# Patient Record
Sex: MALE | Race: WHITE | Employment: FULL TIME | URBAN - METROPOLITAN AREA
[De-identification: names, ages, dates, MRNs, and addresses within clinical notes are randomized per-mention and may not be internally consistent; named-entity substitution may affect disease eponyms.]

---

## 2017-06-27 ENCOUNTER — THERAPY VISIT (OUTPATIENT)
Dept: PHYSICAL THERAPY | Facility: CLINIC | Age: 52
End: 2017-06-27
Payer: COMMERCIAL

## 2017-06-27 DIAGNOSIS — M54.50 CHRONIC RIGHT-SIDED LOW BACK PAIN WITHOUT SCIATICA: ICD-10-CM

## 2017-06-27 DIAGNOSIS — M25.551 PAIN OF RIGHT HIP JOINT: ICD-10-CM

## 2017-06-27 DIAGNOSIS — G89.29 CHRONIC RIGHT-SIDED LOW BACK PAIN WITHOUT SCIATICA: ICD-10-CM

## 2017-06-27 PROCEDURE — 97110 THERAPEUTIC EXERCISES: CPT | Mod: GP | Performed by: PHYSICAL THERAPIST

## 2017-06-27 PROCEDURE — 97161 PT EVAL LOW COMPLEX 20 MIN: CPT | Mod: GP | Performed by: PHYSICAL THERAPIST

## 2017-06-27 NOTE — PROGRESS NOTES
Dale Durán is a 51 year old male patient presenting to Physical Therapy with the following Primary Symptoms: Right sided lower rib cage , ocassional buttock pain, occasional lateral right lateral hip pain. lumbar/flank pain,  Left knee strain.  He denies having related symptoms spreading to the lower leg, ankle, feet or toes.   He has left knee pain along the anteriomedial aspect. These pains are described as intermittent in the knee and nearly constant in the R flank.   He denies having painful cough/sneeze, saddle anaesthesia, severe night pain, peripheral motor deficit, recent bowel/bladder change, recent vision change, ringing in the ears or pain with swallowing. Pain is rated 4/10 at worst. History of symptoms: Winter 2016 noted strain with running, worked with some benefit from PT, Saw Dr. Esquivel  Suspicion of back vs hip, had lux epidural may 2016 with no effect, developed a R flank strain that has not gone away since, Hx of neck pain.  Saw Rachid Gagnon with good result on hip pain,  Then did [facility] therapy in [place], Did some PT at [facility] no lasting gains.  Then developed shingles in R gluteal which resolved.  Returned to [facility] more PT, then to MRI at [facility] spine. Sx are constant in the R low back region.  Current Symptoms are worsened by: confined sitting, prolonged standing.  Current Symptoms are relieved by painf ree while exercising.   Recent surgical procedure: none    General health as reported by patient is:  excellent.  Pertinent medical history: none significant.  He denies any significant current illness or recent hospital admissions. He denies any regonal implanted devices.  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 Previous functional status:  fully functional prior to pain onset/injury.       chapo  [facility]                    Objective:          Flexibility/Screens:   Positive screens:  Hip (positive bilat hip drop sign, overactive hip flexor component in  abd task.   limited R>L hip extension PROM in SL. )                   Lumbar/SI Evaluation  ROM:    AROM Lumbar:   Flexion:            Fingers to floor no pain  Ext:                    50%  end range pain central LB   Side Bend:        Left:  75%    Right:  75%  Rotation:           Left:     Right:   Side Glide:        Left:  50%    Right:  50%          Lumbar Myotomes:  not assessed            Lumbar DTR's:  not assessed      Cord Signs:  not assessed    Lumbar Dermtomes:  normal                  Lumbar Palpation:        Tenderness not present at Right:  Quadratus Lumborum; Erector Spinae; PSIS or Vertebral      Spinal Segmental Conclusions: Hypo Lx in extension not provocative                                            Hip Evaluation    Hip Strength:        Abduction:  Left: 3/5     Pain:Right: 3/5    Pain:  Adduction:  Left: 5/5    Pain:Right: 5/5   Pain:  Internal Rotation:  Left: 4/5    Pain:Right: 4/5   Pain:  External Rotation:  Left: 4/5   Pain:  Right: 4/5   Pain:            Hip Special Testing:    Left hip positive for the following special tests:  Enio  Left hip negative for the following special tests:  Willi; Fadir/Labrum; SLR or Femoral Nerve   Right hip positive for the following special tests:  Willi (very tight not painful) and ThomasRight hip negative for the following special tests:  Fadir/Labrum; SLR or Femoral Nerve                 General     ROS    Assessment/Plan:      Patient is a 51 year old male with lumbar and right side hip complaints.    Patient has the following significant findings with corresponding treatment plan.                Diagnosis 1:  R hip dysfunction, poss djd.  Lx DJD, extension dysfunction  Pain -  hot/cold therapy, manual therapy, splint/taping/bracing/orthotics, self management, education, directional preference exercise and home program  Decreased ROM/flexibility - manual therapy and therapeutic exercise  Decreased joint mobility - manual therapy and therapeutic  exercise  Decreased strength - therapeutic exercise and therapeutic activities  Decreased proprioception - neuro re-education and therapeutic activities  Impaired gait - gait training  Impaired muscle performance - neuro re-education  Decreased function - therapeutic activities  Impaired posture - neuro re-education    Therapy Evaluation Codes:   1) History comprised of:   Personal factors that impact the plan of care:      Time since onset of symptoms.    Comorbidity factors that impact the plan of care are:      None.     Medications impacting care: None.  2) Examination of Body Systems comprised of:   Body structures and functions that impact the plan of care:      Hip and Lumbar spine.   Activity limitations that impact the plan of care are:      Driving, Dressing, Sitting, Standing and Working.  3) Clinical presentation characteristics are:   Stable/Uncomplicated.  4) Decision-Making    Low complexity using standardized patient assessment instrument and/or measureable assessment of functional outcome.  Cumulative Therapy Evaluation is: Low complexity.    Previous and current functional limitations:  (See Goal Flow Sheet for this information)    Short term and Long term goals: (See Goal Flow Sheet for this information)     Communication ability:  Patient appears to be able to clearly communicate and understand verbal and written communication and follow directions correctly.  Treatment Explanation - The following has been discussed with the patient:   RX ordered/plan of care  Anticipated outcomes  Possible risks and side effects  This patient would benefit from PT intervention to resume normal activities.   Rehab potential is good.    Frequency:  1 X week, once daily  Duration:  for 8 weeks  Discharge Plan:  Achieve all LTG.  Independent in home treatment program.  Reach maximal therapeutic benefit.    Please refer to the daily flowsheet for treatment today, total treatment time and time spent performing 1:1  timed codes.

## 2017-06-28 PROBLEM — M54.50 CHRONIC RIGHT-SIDED LOW BACK PAIN WITHOUT SCIATICA: Status: ACTIVE | Noted: 2017-06-28

## 2017-06-28 PROBLEM — G89.29 CHRONIC RIGHT-SIDED LOW BACK PAIN WITHOUT SCIATICA: Status: ACTIVE | Noted: 2017-06-28

## 2017-06-28 PROBLEM — M25.551 PAIN OF RIGHT HIP JOINT: Status: ACTIVE | Noted: 2017-06-28

## 2017-07-05 ENCOUNTER — THERAPY VISIT (OUTPATIENT)
Dept: PHYSICAL THERAPY | Facility: CLINIC | Age: 52
End: 2017-07-05
Payer: COMMERCIAL

## 2017-07-05 DIAGNOSIS — M54.50 CHRONIC RIGHT-SIDED LOW BACK PAIN WITHOUT SCIATICA: ICD-10-CM

## 2017-07-05 DIAGNOSIS — M25.551 PAIN OF RIGHT HIP JOINT: ICD-10-CM

## 2017-07-05 DIAGNOSIS — G89.29 CHRONIC RIGHT-SIDED LOW BACK PAIN WITHOUT SCIATICA: ICD-10-CM

## 2017-07-05 PROCEDURE — 97530 THERAPEUTIC ACTIVITIES: CPT | Mod: GP | Performed by: PHYSICAL THERAPIST

## 2017-07-05 PROCEDURE — 97140 MANUAL THERAPY 1/> REGIONS: CPT | Mod: GP | Performed by: PHYSICAL THERAPIST

## 2017-07-05 PROCEDURE — 97110 THERAPEUTIC EXERCISES: CPT | Mod: GP | Performed by: PHYSICAL THERAPIST

## 2017-07-10 ENCOUNTER — THERAPY VISIT (OUTPATIENT)
Dept: PHYSICAL THERAPY | Facility: CLINIC | Age: 52
End: 2017-07-10
Payer: COMMERCIAL

## 2017-07-10 DIAGNOSIS — G89.29 CHRONIC RIGHT-SIDED LOW BACK PAIN WITHOUT SCIATICA: ICD-10-CM

## 2017-07-10 DIAGNOSIS — M25.551 PAIN OF RIGHT HIP JOINT: ICD-10-CM

## 2017-07-10 DIAGNOSIS — M54.50 CHRONIC RIGHT-SIDED LOW BACK PAIN WITHOUT SCIATICA: ICD-10-CM

## 2017-07-10 PROCEDURE — 97110 THERAPEUTIC EXERCISES: CPT | Mod: GP | Performed by: PHYSICAL THERAPIST

## 2017-07-10 PROCEDURE — 97112 NEUROMUSCULAR REEDUCATION: CPT | Mod: GP | Performed by: PHYSICAL THERAPIST

## 2017-07-17 ENCOUNTER — THERAPY VISIT (OUTPATIENT)
Dept: PHYSICAL THERAPY | Facility: CLINIC | Age: 52
End: 2017-07-17
Payer: COMMERCIAL

## 2017-07-17 DIAGNOSIS — M25.551 PAIN OF RIGHT HIP JOINT: ICD-10-CM

## 2017-07-17 DIAGNOSIS — M54.50 CHRONIC RIGHT-SIDED LOW BACK PAIN WITHOUT SCIATICA: ICD-10-CM

## 2017-07-17 DIAGNOSIS — G89.29 CHRONIC RIGHT-SIDED LOW BACK PAIN WITHOUT SCIATICA: ICD-10-CM

## 2017-07-17 PROCEDURE — 97112 NEUROMUSCULAR REEDUCATION: CPT | Mod: GP | Performed by: PHYSICAL THERAPIST

## 2017-07-17 PROCEDURE — 97140 MANUAL THERAPY 1/> REGIONS: CPT | Mod: GP | Performed by: PHYSICAL THERAPIST

## 2017-07-17 PROCEDURE — 97110 THERAPEUTIC EXERCISES: CPT | Mod: GP | Performed by: PHYSICAL THERAPIST

## 2017-08-01 ENCOUNTER — THERAPY VISIT (OUTPATIENT)
Dept: PHYSICAL THERAPY | Facility: CLINIC | Age: 52
End: 2017-08-01
Payer: COMMERCIAL

## 2017-08-01 DIAGNOSIS — M54.50 CHRONIC RIGHT-SIDED LOW BACK PAIN WITHOUT SCIATICA: ICD-10-CM

## 2017-08-01 DIAGNOSIS — M25.551 PAIN OF RIGHT HIP JOINT: ICD-10-CM

## 2017-08-01 DIAGNOSIS — G89.29 CHRONIC RIGHT-SIDED LOW BACK PAIN WITHOUT SCIATICA: ICD-10-CM

## 2017-08-01 PROCEDURE — 97110 THERAPEUTIC EXERCISES: CPT | Mod: GP | Performed by: PHYSICAL THERAPIST

## 2017-08-14 ENCOUNTER — THERAPY VISIT (OUTPATIENT)
Dept: PHYSICAL THERAPY | Facility: CLINIC | Age: 52
End: 2017-08-14
Payer: COMMERCIAL

## 2017-08-14 DIAGNOSIS — M25.551 PAIN OF RIGHT HIP JOINT: ICD-10-CM

## 2017-08-14 DIAGNOSIS — G89.29 CHRONIC RIGHT-SIDED LOW BACK PAIN WITHOUT SCIATICA: ICD-10-CM

## 2017-08-14 DIAGNOSIS — M54.50 CHRONIC RIGHT-SIDED LOW BACK PAIN WITHOUT SCIATICA: ICD-10-CM

## 2017-08-14 PROCEDURE — 97530 THERAPEUTIC ACTIVITIES: CPT | Mod: GP | Performed by: PHYSICAL THERAPIST

## 2017-08-14 PROCEDURE — 97110 THERAPEUTIC EXERCISES: CPT | Mod: GP | Performed by: PHYSICAL THERAPIST

## 2017-08-14 NOTE — PROGRESS NOTES
Subjective:    HPI                    Objective:    System    Physical Exam    General     ROS    Assessment/Plan:      PROGRESS  REPORT    Progress reporting period is to 8-14-17.       SUBJECTIVE  Overall Dale is feeling better, he gets some intermittent mm fatigue, getting on and off R buttock pain and probable shingles component    Current pain level is   .     Previous pain level was    .   Changes in function:  Yes (See Goal flowsheet attached for changes in current functional level)  Adverse reaction to treatment or activity: None    OBJECTIVE  fair R pelvifemoral control with 4 inch step down.  shoe assessment shows left strike wear lateral side.     ASSESSMENT/PLAN  Updated problem list and treatment plan: Diagnosis 1:  Back and hip mm pain  Pain -  manual therapy, splint/taping/bracing/orthotics, self management, education, directional preference exercise and home program  Decreased ROM/flexibility - manual therapy and therapeutic exercise  Decreased strength - therapeutic exercise and therapeutic activities  Decreased proprioception - neuro re-education and therapeutic activities  Impaired muscle performance - neuro re-education  Decreased function - therapeutic activities  STG/LTGs have been met or progress has been made towards goals:  Yes (See Goal flow sheet completed today.)  Assessment of Progress: The patient's condition is improving.  Self Management Plans:  Patient has been instructed in a home treatment program.    Dale continues to require the following intervention to meet STG and LTG's:  PT    Recommendations:  This patient would benefit from continued therapy.     Frequency:  1 X week, once daily  Duration:  for 4 weeks          Please refer to the daily flowsheet for treatment today, total treatment time and time spent performing 1:1 timed codes.

## 2017-08-28 ENCOUNTER — THERAPY VISIT (OUTPATIENT)
Dept: PHYSICAL THERAPY | Facility: CLINIC | Age: 52
End: 2017-08-28
Payer: COMMERCIAL

## 2017-08-28 DIAGNOSIS — M25.551 PAIN OF RIGHT HIP JOINT: ICD-10-CM

## 2017-08-28 DIAGNOSIS — G89.29 CHRONIC RIGHT-SIDED LOW BACK PAIN WITHOUT SCIATICA: ICD-10-CM

## 2017-08-28 DIAGNOSIS — M54.50 CHRONIC RIGHT-SIDED LOW BACK PAIN WITHOUT SCIATICA: ICD-10-CM

## 2017-08-28 PROCEDURE — 97530 THERAPEUTIC ACTIVITIES: CPT | Mod: GP | Performed by: PHYSICAL THERAPIST

## 2017-08-28 PROCEDURE — 97110 THERAPEUTIC EXERCISES: CPT | Mod: GP | Performed by: PHYSICAL THERAPIST

## 2017-08-28 PROCEDURE — 97112 NEUROMUSCULAR REEDUCATION: CPT | Mod: GP | Performed by: PHYSICAL THERAPIST

## 2017-09-18 ENCOUNTER — THERAPY VISIT (OUTPATIENT)
Dept: PHYSICAL THERAPY | Facility: CLINIC | Age: 52
End: 2017-09-18
Payer: COMMERCIAL

## 2017-09-18 DIAGNOSIS — M54.50 CHRONIC RIGHT-SIDED LOW BACK PAIN WITHOUT SCIATICA: ICD-10-CM

## 2017-09-18 DIAGNOSIS — M25.551 PAIN OF RIGHT HIP JOINT: ICD-10-CM

## 2017-09-18 DIAGNOSIS — G89.29 CHRONIC RIGHT-SIDED LOW BACK PAIN WITHOUT SCIATICA: ICD-10-CM

## 2017-09-18 PROCEDURE — 97112 NEUROMUSCULAR REEDUCATION: CPT | Mod: GP | Performed by: PHYSICAL THERAPIST

## 2017-09-18 PROCEDURE — 97110 THERAPEUTIC EXERCISES: CPT | Mod: GP | Performed by: PHYSICAL THERAPIST

## 2017-10-03 ENCOUNTER — THERAPY VISIT (OUTPATIENT)
Dept: PHYSICAL THERAPY | Facility: CLINIC | Age: 52
End: 2017-10-03
Payer: COMMERCIAL

## 2017-10-03 DIAGNOSIS — S46.811A STRAIN OF RIGHT LEVATOR SCAPULAE MUSCLE: Primary | ICD-10-CM

## 2017-10-03 PROCEDURE — 97161 PT EVAL LOW COMPLEX 20 MIN: CPT | Mod: GP | Performed by: PHYSICAL THERAPIST

## 2017-10-03 PROCEDURE — 97110 THERAPEUTIC EXERCISES: CPT | Mod: GP | Performed by: PHYSICAL THERAPIST

## 2017-10-04 PROBLEM — S46.811A STRAIN OF RIGHT LEVATOR SCAPULAE MUSCLE: Status: ACTIVE | Noted: 2017-10-04

## 2017-10-04 NOTE — PROGRESS NOTES
Dale Durán is a 51 year old male patient presenting to Physical Therapy with the following Primary Symptoms: Right sided neck muscle aching.  He denies having related symptoms spreading to the left side, head, face, or into the arm. These pains are described as intermittent.   He denies having painful cough/sneeze, saddle anaesthesia, severe night pain, peripheral motor deficit, recent bowel/bladder change, recent vision change, ringing in the ears or pain with swallowing.  History of symptoms: These pains began gradually  Ten years ago as the result of insidious onset, specific episode or injury.  Previous treatment has included theracane.   Since onset, these pains are stable :  Current Symptoms are worsened by: sitting with head turned to right, after running, using R handed mouse at computer for prolonged time. Current Symptoms are relieved by sitting head turned left.  Has done much chiropractic and massage treatement.   Recent surgical procedure: none.   General health as reported by patient is:  excellent.  Pertinent medical history: none significant regionally,  He does have multi region mm aching .  He was found to have myomalacia on MRI scan of this region and some discal and foramial changes.  (full report pending).  He denies any significant current illness or recent hospital admissions. He denies any regonal implanted devices.  Current occupational status: executive. Previous functional status:  fully functional prior to pain onset/injury.         HPI                    Objective:    System              Cervical/Thoracic Evaluation    AROM:  AROM Cervical:    Flexion:            NL  Extension:       NL  Rotation:         Left: 80%     Right: NL  Side Bend:      Left: NL, end range strain     Right:  NL        Cervical Myotomes:    C1-2 (Neck Flex): Left:  5    Right: 5  C3 (neck side bend): Left: 5    Right: 5  C4 (shrug):  Left: 5    Right: 5  C5 (Deltoid):  Left: 5    Right: 5          DTR's:   not assessed          Cervical Dermatomes:  not assessed                    Cervical Palpation:  : L vs R SCM insertion variance.  L at medial clavical border, R at approx 1.5 inches more lateral.    Tenderness present at Right:    Levator and Suboccipitals  Functional Tests:  Core strength and proprioception spine wnl: R Levator Scapulae proninence, L absence in supine head lift  examination.        Cord Sign:  not assessed                                            General     ROS    Assessment/Plan:      Patient is a 51 year old male with cervical complaints.    Patient has the following significant findings with corresponding treatment plan.                Diagnosis 1:  Levator scapulae dysfunction  Pain -  hot/cold therapy, manual therapy, self management, education, directional preference exercise and home program  Decreased strength - therapeutic exercise and therapeutic activities  Decreased proprioception - neuro re-education and therapeutic activities  Impaired muscle performance - neuro re-education  Decreased function - therapeutic activities    Therapy Evaluation Codes:   1) History comprised of:   Personal factors that impact the plan of care:      None.    Comorbidity factors that impact the plan of care are:      None.     Medications impacting care: None.  2) Examination of Body Systems comprised of:   Body structures and functions that impact the plan of care:      Cervical spine.   Activity limitations that impact the plan of care are:      Driving and Sitting.  3) Clinical presentation characteristics are:   Stable/Uncomplicated.  4) Decision-Making    Low complexity using standardized patient assessment instrument and/or measureable assessment of functional outcome.  Cumulative Therapy Evaluation is: Low complexity.    Previous and current functional limitations:  (See Goal Flow Sheet for this information)    Short term and Long term goals: (See Goal Flow Sheet for this information)      Communication ability:  Patient appears to be able to clearly communicate and understand verbal and written communication and follow directions correctly.  Treatment Explanation - The following has been discussed with the patient:   RX ordered/plan of care  Anticipated outcomes  Possible risks and side effects  This patient would benefit from PT intervention to resume normal activities.   Rehab potential is good.    Frequency:  2 X a month, once daily  Duration:  for 2 months  Discharge Plan:  Achieve all LTG.  Independent in home treatment program.  Reach maximal therapeutic benefit.    Please refer to the daily flowsheet for treatment today, total treatment time and time spent performing 1:1 timed codes.

## 2017-10-11 ENCOUNTER — THERAPY VISIT (OUTPATIENT)
Dept: PHYSICAL THERAPY | Facility: CLINIC | Age: 52
End: 2017-10-11
Payer: COMMERCIAL

## 2017-10-11 DIAGNOSIS — M25.551 PAIN OF RIGHT HIP JOINT: ICD-10-CM

## 2017-10-11 DIAGNOSIS — M54.50 CHRONIC RIGHT-SIDED LOW BACK PAIN WITHOUT SCIATICA: ICD-10-CM

## 2017-10-11 DIAGNOSIS — G89.29 CHRONIC RIGHT-SIDED LOW BACK PAIN WITHOUT SCIATICA: ICD-10-CM

## 2017-10-11 PROCEDURE — 97116 GAIT TRAINING THERAPY: CPT | Mod: GP | Performed by: PHYSICAL THERAPIST

## 2017-10-11 PROCEDURE — 97110 THERAPEUTIC EXERCISES: CPT | Mod: GP | Performed by: PHYSICAL THERAPIST

## 2017-10-11 PROCEDURE — 97164 PT RE-EVAL EST PLAN CARE: CPT | Mod: GP | Performed by: PHYSICAL THERAPIST

## 2017-10-11 NOTE — MR AVS SNAPSHOT
"              After Visit Summary   10/11/2017    Dale Durán    MRN: [id number]           Patient Information     Date Of Birth          1965        Visit Information        Provider Department      10/11/2017 8:00 AM Roshni Arboleda, PT [facility]        Today's Diagnoses     Pain of right hip joint        Chronic right-sided low back pain without sciatica           Follow-ups after your visit        Your next 10 appointments already scheduled     Oct 30, 2017  9:05 AM CDT   [facility] with Roshni Arboleda PT   [facility] ([facility]  )    [address] [facility]  [address] [place] [id number]   [phone]            Nov 06, 2017  8:25 AM CST   [facility] with Roshni Arboleda, PT   [facility] ([facility]  )    [address] [address] [id number] [phone]              Who to contact     If you have questions or need follow up information about today's clinic visit or your schedule please contact [facility] directly at [phone].  Normal or non-critical lab and imaging results will be communicated to you by [employer], letter or phone within 4 business days after the clinic has received the results. If you do not hear from us within 7 days, please contact the clinic through [employer] or phone. If you have a critical or abnormal lab result, we will notify you by phone as soon as possible.  Submit refill requests through [employer] or call your pharmacy and they will forward the refill request to us. Please allow 3 business days for your refill to be completed.          Additional Information About Your Visit        [employer] Information     [employer] lets you send messages to your doctor, view your test results, renew your prescriptions, schedule appointments and more. To sign up, go to [email or web address] . Click on \"Log in\" on the left side of the screen, which will take you to the Welcome page. Then click on \"Sign up Now\" " on the right side of the page.     You will be asked to enter the access code listed below, as well as some personal information. Please follow the directions to create your username and password.     Your access code is: [email or web address]  Expires: 2017  8:05 AM     Your access code will  in 90 days. If you need help or a new code, please call your [facility] clinic or [phone].        Care EveryWhere ID     This is your Care EveryWhere ID. This could be used by other organizations to access your [facility] medical records  [id number]         Blood Pressure from Last 3 Encounters:   No data found for BP    Weight from Last 3 Encounters:   No data found for Wt              We Performed the Following     GAIT TRAINING THERAPY     HC PT RE-EVAL     [facility] PROGRESS NOTES REPORT     THERAPEUTIC EXERCISES        Primary Care Provider    None Specified       No primary provider on file.        Equal Access to Services     [facility]: Hadii antione Charles, waaxda kaleyadaha, qaybta kaalmatien ware, baljeet leija . So [facility] [phone].    ATENCIÓN: Si habla español, tiene a elmore disposición servicios gratuitos de asistencia lingüística. [facility] [phone].    We comply with applicable federal civil rights laws and [place] laws. We do not discriminate on the basis of race, color, national origin, age, disability, sex, sexual orientation, or gender identity.            Thank you!     Thank you for choosing [facility]  for your care. Our goal is always to provide you with excellent care. Hearing back from our patients is one way we can continue to improve our services. Please take a few minutes to complete the written survey that you may receive in the mail after your visit with us. Thank you!             Your Updated Medication List - Protect others around you: Learn how to safely use, store and throw away your medicines at www.disposemymeds.org.      Notice  As of  10/11/2017  2:03 PM    You have not been prescribed any medications.

## 2017-11-06 ENCOUNTER — THERAPY VISIT (OUTPATIENT)
Dept: PHYSICAL THERAPY | Facility: CLINIC | Age: 52
End: 2017-11-06
Payer: COMMERCIAL

## 2017-11-06 DIAGNOSIS — M54.50 CHRONIC RIGHT-SIDED LOW BACK PAIN WITHOUT SCIATICA: ICD-10-CM

## 2017-11-06 DIAGNOSIS — G89.29 CHRONIC RIGHT-SIDED LOW BACK PAIN WITHOUT SCIATICA: ICD-10-CM

## 2017-11-06 DIAGNOSIS — M25.551 PAIN OF RIGHT HIP JOINT: ICD-10-CM

## 2017-11-06 PROCEDURE — 97110 THERAPEUTIC EXERCISES: CPT | Mod: GP | Performed by: PHYSICAL THERAPIST

## 2017-11-06 PROCEDURE — 97112 NEUROMUSCULAR REEDUCATION: CPT | Mod: GP | Performed by: PHYSICAL THERAPIST

## 2017-11-20 ENCOUNTER — THERAPY VISIT (OUTPATIENT)
Dept: PHYSICAL THERAPY | Facility: CLINIC | Age: 52
End: 2017-11-20
Payer: COMMERCIAL

## 2017-11-20 DIAGNOSIS — M25.551 PAIN OF RIGHT HIP JOINT: ICD-10-CM

## 2017-11-20 DIAGNOSIS — G89.29 CHRONIC RIGHT-SIDED LOW BACK PAIN WITHOUT SCIATICA: ICD-10-CM

## 2017-11-20 DIAGNOSIS — M54.50 CHRONIC RIGHT-SIDED LOW BACK PAIN WITHOUT SCIATICA: ICD-10-CM

## 2017-11-20 PROCEDURE — 97112 NEUROMUSCULAR REEDUCATION: CPT | Mod: GP | Performed by: PHYSICAL THERAPIST

## 2017-11-20 PROCEDURE — 97110 THERAPEUTIC EXERCISES: CPT | Mod: GP | Performed by: PHYSICAL THERAPIST

## 2017-11-20 NOTE — PROGRESS NOTES
Subjective:    HPI                    Objective:    System    Physical Exam    General     ROS    Assessment/Plan:      PROGRESS  REPORT    Progress reporting period is from 10/11/17.       SUBJECTIVE  Subjective changes noted by patient:   Here today for PT running/gait analysis. Has been seeing Paul Breyen for PT. Works out 6 days/week. Runs M/W/Saturdays. Running ~10 miles/week on trails.Does some speed work. Also spins. Pain R LB/buttock is about the same and generally worse with prolonged sitting/standing. Hx. of B neuromas removed in 1990's so got orthotics and worn on/off since then.  At the present time, prefers not to wear them with running. Wears Foster Glycerin (motion control) or trail shoes (zero drop).     Current pain level is 2/10  .     Previous pain level was  5/10  .   Changes in function:  Yes (See Goal flowsheet attached for changes in current functional level)  Adverse reaction to treatment or activity: activity - sitting/standing prolonged    OBJECTIVE  Changes noted in objective findings:  Yes  Objective: Run gait on treadmill: trendelenberg R, slight increased vertical displacement and slight eversion R>L foot. Excessive use of lumbar extensors with SL extension in prone. Initiated glute med strengthening and cues to keep hips level with running.      ASSESSMENT/PLAN  Updated problem list and treatment plan: Diagnosis 1:  R low back pain/knee pain  Pain -  hot/cold therapy, manual therapy, splint/taping/bracing/orthotics, self management, education and home program  Decreased ROM/flexibility - manual therapy and therapeutic exercise  Decreased strength - therapeutic exercise and therapeutic activities  Decreased proprioception - neuro re-education and therapeutic activities  Impaired gait - gait training  Impaired muscle performance - neuro re-education  Decreased function - therapeutic activities  Impaired posture - neuro re-education  STG/LTGs have been met or progress has been made  towards goals:  Yes (See Goal flow sheet completed today.)  Assessment of Progress: The patient's condition is improving.  Self Management Plans:  Patient has been instructed in a home treatment program.  Patient  has been instructed in self management of symptoms.    Dale continues to require the following intervention to meet STG and LTG's:  PT    Recommendations:  This patient would benefit from continued therapy.     Frequency:  1 X a month, once daily  Duration:  for 4 months          Please refer to the daily flowsheet for treatment today, total treatment time and time spent performing 1:1 timed codes.

## 2017-12-04 ENCOUNTER — THERAPY VISIT (OUTPATIENT)
Dept: PHYSICAL THERAPY | Facility: CLINIC | Age: 52
End: 2017-12-04
Payer: COMMERCIAL

## 2017-12-04 DIAGNOSIS — G89.29 CHRONIC RIGHT-SIDED LOW BACK PAIN WITHOUT SCIATICA: ICD-10-CM

## 2017-12-04 DIAGNOSIS — M25.551 PAIN OF RIGHT HIP JOINT: ICD-10-CM

## 2017-12-04 DIAGNOSIS — M54.50 CHRONIC RIGHT-SIDED LOW BACK PAIN WITHOUT SCIATICA: ICD-10-CM

## 2017-12-04 PROCEDURE — 97112 NEUROMUSCULAR REEDUCATION: CPT | Mod: GP | Performed by: PHYSICAL THERAPIST

## 2017-12-04 PROCEDURE — 97110 THERAPEUTIC EXERCISES: CPT | Mod: GP | Performed by: PHYSICAL THERAPIST

## 2017-12-04 NOTE — PROGRESS NOTES
Subjective:    HPI                    Objective:    System    Physical Exam    General     ROS    Assessment/Plan:      PROGRESS  REPORT    Progress reporting period is from 12/04/17.       SUBJECTIVE  Subjective changes noted by patient:  .  Subjective: R side back more sore since long driving over the holiday. Back was flareup for about a week, now trending  in right direction. Ran today, and spinning going well. Generally worse when prolonged sitting or not moving around. Still feeling some R knee pain in morning down stairs.     Current pain level is 2/10  .     Previous pain level was  4/10  .   Changes in function:  Yes (See Goal flowsheet attached for changes in current functional level)  Adverse reaction to treatment or activity: prolonged driving/sitting    OBJECTIVE  Changes noted in objective findings:  Yes,   Objective: Slight overpronation B feet. Recommend low arch support vs medium with neutral/cushion shoe to avoid overcorrection. Rev'd HEP. Was planking position with WB too far forward. Also discussed limited thoracic mobility affecting arm lift with plank. Improving eccentric control R quad with step down. Needs cues for sitting neutral with driving/stand desk.      ASSESSMENT/PLAN  Updated problem list and treatment plan: Diagnosis 1:  R LBP/knee pain  Pain -  hot/cold therapy, manual therapy, self management, education and home program  Decreased ROM/flexibility - manual therapy and therapeutic exercise  Decreased joint mobility - manual therapy and therapeutic exercise  Decreased strength - therapeutic exercise and therapeutic activities  Decreased proprioception - neuro re-education and therapeutic activities  Inflammation - cold therapy and self management/home program  Impaired muscle performance - neuro re-education  Decreased function - therapeutic activities  Impaired posture - neuro re-education  STG/LTGs have been met or progress has been made towards goals:  Yes (See Goal flow sheet  completed today.)  Assessment of Progress: The patient's condition is improving.  Self Management Plans:  Patient has been instructed in a home treatment program.  Patient  has been instructed in self management of symptoms.    Dale continues to require the following intervention to meet STG and LTG's:  PT    Recommendations:  This patient would benefit from continued therapy.     Frequency:  2 X a month, once daily  Duration:  for 3 months          Please refer to the daily flowsheet for treatment today, total treatment time and time spent performing 1:1 timed codes.

## 2017-12-04 NOTE — MR AVS SNAPSHOT
After Visit Summary   12/4/2017    Dale Durán    MRN: [id number]           Patient Information     Date Of Birth          1965        Visit Information        Provider Department      12/4/2017 8:25 AM Roshni Arboleda, PT [facility]        Today's Diagnoses     Pain of right hip joint        Chronic right-sided low back pain without sciatica           Follow-ups after your visit        Your next 10 appointments already scheduled     Dec 21, 2017  8:40 AM CST   [facility] Running with Roshni Arboleda, PT   [facility] ([facility] [place]  )    [address] [place] [address]   [phone]            Jan 11, 2018  8:00 AM CST   [facility] Running with Roshni Arboleda, PT   [facility] ([facility] [place]  )    [address] [place] [address]   [phone]            Jan 24, 2018  8:25 AM CST   [facility] Running with Roshni Arboleda, PT   [facility] ([facility] [place]  )    [address] [place] [address]   [phone]              Who to contact     If you have questions or need follow up information about today's clinic visit or your schedule please contact [facility] directly at [phone].  Normal or non-critical lab and imaging results will be communicated to you by bContexthart, letter or phone within 4 business days after the clinic has received the results. If you do not hear from us within 7 days, please contact the clinic through bContexthart or phone. If you have a critical or abnormal lab result, we will notify you by phone as soon as possible.  Submit refill requests through [email or web address] or call your pharmacy and they will forward the refill request to us. Please allow 3 business days for your refill to be completed.          Additional Information About Your Visit        [email or web address] Information     [email or web address] lets you send messages to your doctor, view your test results, renew your prescriptions,  "schedule appointments and more. To sign up, go to [email or web address] . Click on \"Log in\" on the left side of the screen, which will take you to the Welcome page. Then click on \"Sign up Now\" on the right side of the page.     You will be asked to enter the access code listed below, as well as some personal information. Please follow the directions to create your username and password.     Your access code is: [email or web address]  Expires: 2017  7:05 AM     Your access code will  in 90 days. If you need help or a new code, please call your [place] clinic or [phone].        Care EveryWhere ID     This is your Care EveryWhere ID. This could be used by other organizations to access your [facility] medical records  [id number]         Blood Pressure from Last 3 Encounters:   No data found for BP    Weight from Last 3 Encounters:   No data found for Wt              We Performed the Following     [facility] PROGRESS NOTES REPORT     NEUROMUSCULAR RE-EDUCATION     THERAPEUTIC EXERCISES        Primary Care Provider Office Phone # Fax #    Braulio Deshpande [phone]       [facility] [address] [facility] [address]        Equal Access to Services     BRYCE ARORA : Hadii antione ruizo Sostaci, wahoustonda kim, qaybta kaalmada adeegyada, baljeet leija . So [facility] [phone].    ATENCIÓN: Si habla español, tiene a elmore disposición servicios gratuitos de asistencia lingüística. Llame al [phone].    We comply with applicable federal civil rights laws and [place] laws. We do not discriminate on the basis of race, color, national origin, age, disability, sex, sexual orientation, or gender identity.            Thank you!     Thank you for choosing [facility]  for your care. Our goal is always to provide you with excellent care. Hearing back from our patients is one way we can continue to improve our services. Please take a few minutes to " complete the written survey that you may receive in the mail after your visit with us. Thank you!             Your Updated Medication List - Protect others around you: Learn how to safely use, store and throw away your medicines at www.disposemymeds.org.      Notice  As of 12/4/2017  1:35 PM    You have not been prescribed any medications.

## 2017-12-21 ENCOUNTER — THERAPY VISIT (OUTPATIENT)
Dept: PHYSICAL THERAPY | Facility: CLINIC | Age: 52
End: 2017-12-21
Payer: COMMERCIAL

## 2017-12-21 DIAGNOSIS — G89.29 CHRONIC RIGHT-SIDED LOW BACK PAIN WITHOUT SCIATICA: ICD-10-CM

## 2017-12-21 DIAGNOSIS — S46.811A STRAIN OF RIGHT LEVATOR SCAPULAE MUSCLE: ICD-10-CM

## 2017-12-21 DIAGNOSIS — M54.50 CHRONIC RIGHT-SIDED LOW BACK PAIN WITHOUT SCIATICA: ICD-10-CM

## 2017-12-21 DIAGNOSIS — M25.551 PAIN OF RIGHT HIP JOINT: ICD-10-CM

## 2017-12-21 PROCEDURE — 97112 NEUROMUSCULAR REEDUCATION: CPT | Mod: GP | Performed by: PHYSICAL THERAPIST

## 2017-12-21 PROCEDURE — 97110 THERAPEUTIC EXERCISES: CPT | Mod: GP | Performed by: PHYSICAL THERAPIST

## 2018-01-11 ENCOUNTER — THERAPY VISIT (OUTPATIENT)
Dept: PHYSICAL THERAPY | Facility: CLINIC | Age: 53
End: 2018-01-11
Payer: COMMERCIAL

## 2018-01-11 DIAGNOSIS — S46.811A STRAIN OF RIGHT LEVATOR SCAPULAE MUSCLE: ICD-10-CM

## 2018-01-11 DIAGNOSIS — M54.50 CHRONIC RIGHT-SIDED LOW BACK PAIN WITHOUT SCIATICA: ICD-10-CM

## 2018-01-11 DIAGNOSIS — G89.29 CHRONIC RIGHT-SIDED LOW BACK PAIN WITHOUT SCIATICA: ICD-10-CM

## 2018-01-11 DIAGNOSIS — M25.551 PAIN OF RIGHT HIP JOINT: ICD-10-CM

## 2018-01-11 PROCEDURE — 97110 THERAPEUTIC EXERCISES: CPT | Mod: GP | Performed by: PHYSICAL THERAPIST

## 2018-01-11 PROCEDURE — 97112 NEUROMUSCULAR REEDUCATION: CPT | Mod: GP | Performed by: PHYSICAL THERAPIST

## 2018-01-24 ENCOUNTER — THERAPY VISIT (OUTPATIENT)
Dept: PHYSICAL THERAPY | Facility: CLINIC | Age: 53
End: 2018-01-24
Payer: COMMERCIAL

## 2018-01-24 DIAGNOSIS — M54.50 CHRONIC RIGHT-SIDED LOW BACK PAIN WITHOUT SCIATICA: ICD-10-CM

## 2018-01-24 DIAGNOSIS — M25.551 PAIN OF RIGHT HIP JOINT: ICD-10-CM

## 2018-01-24 DIAGNOSIS — S46.811A STRAIN OF RIGHT LEVATOR SCAPULAE MUSCLE: ICD-10-CM

## 2018-01-24 DIAGNOSIS — G89.29 CHRONIC RIGHT-SIDED LOW BACK PAIN WITHOUT SCIATICA: ICD-10-CM

## 2018-01-24 PROCEDURE — 97530 THERAPEUTIC ACTIVITIES: CPT | Mod: GP | Performed by: PHYSICAL THERAPIST

## 2018-01-24 PROCEDURE — 97110 THERAPEUTIC EXERCISES: CPT | Mod: GP | Performed by: PHYSICAL THERAPIST

## 2018-01-24 PROCEDURE — 97112 NEUROMUSCULAR REEDUCATION: CPT | Mod: GP | Performed by: PHYSICAL THERAPIST

## 2018-02-14 ENCOUNTER — THERAPY VISIT (OUTPATIENT)
Dept: PHYSICAL THERAPY | Facility: CLINIC | Age: 53
End: 2018-02-14
Payer: COMMERCIAL

## 2018-02-14 DIAGNOSIS — M25.551 PAIN OF RIGHT HIP JOINT: ICD-10-CM

## 2018-02-14 DIAGNOSIS — G89.29 CHRONIC RIGHT-SIDED LOW BACK PAIN WITHOUT SCIATICA: ICD-10-CM

## 2018-02-14 DIAGNOSIS — M54.50 CHRONIC RIGHT-SIDED LOW BACK PAIN WITHOUT SCIATICA: ICD-10-CM

## 2018-02-14 DIAGNOSIS — S46.811A STRAIN OF RIGHT LEVATOR SCAPULAE MUSCLE: ICD-10-CM

## 2018-02-14 PROCEDURE — 97112 NEUROMUSCULAR REEDUCATION: CPT | Mod: GP | Performed by: PHYSICAL THERAPIST

## 2018-02-14 PROCEDURE — 97110 THERAPEUTIC EXERCISES: CPT | Mod: GP | Performed by: PHYSICAL THERAPIST

## 2018-02-14 NOTE — PROGRESS NOTES
Subjective:  HPI                    Objective:  System    Physical Exam    General     ROS    Assessment/Plan:    PROGRESS  REPORT    Progress reporting period is from 02/14/18.       SUBJECTIVE  Subjective changes noted by patient:  Had 2 days of no pain in R LB. Overall happening less often. Getting massage regularly with work on iliopsoas, 1 x week for 5-6 weeks. New program working well. Neck and R shoulder a bit more sore due to driving to [place].Slight discomfort R anterior/lateral knee with descending stairs at home but overall occurring less frequently.     Current pain level is 2/10  .     Previous pain level was  5/10  .   Changes in function:  Yes (See Goal flowsheet attached for changes in current functional level)  Adverse reaction to treatment or activity: None    OBJECTIVE  Changes noted in objective findings:    Objective: WNL lumbar ROM and CROM WNL with end range strain with R rotation. Slight weakness R hip flexor and tightness L hamstring. Added nerve flossing for hamstring and hip flexor strength with cable at home.      ASSESSMENT/PLAN  Updated problem list and treatment plan: Diagnosis 1:  Neck and R LB/knee pain  Pain -  hot/cold therapy, manual therapy, self management, education and home program  Decreased ROM/flexibility - manual therapy and therapeutic exercise  Decreased strength - therapeutic exercise and therapeutic activities  Decreased proprioception - neuro re-education and therapeutic activities  Impaired muscle performance - neuro re-education  Decreased function - therapeutic activities  Impaired posture - neuro re-education  STG/LTGs have been met or progress has been made towards goals:  Yes (See Goal flow sheet completed today.)  Assessment of Progress: The patient's condition is improving.  Self Management Plans:  Patient has been instructed in a home treatment program.  Patient  has been instructed in self management of symptoms.    Dale continues to require the  following intervention to meet STG and LTG's:  PT    Recommendations:  This patient would benefit from continued therapy.     Frequency:  1 X a month, once daily  Duration:  for 3 months        Please refer to the daily flowsheet for treatment today, total treatment time and time spent performing 1:1 timed codes.

## 2018-02-14 NOTE — MR AVS SNAPSHOT
After Visit Summary   2/14/2018    Dale Durán    MRN: [id number]           Patient Information     Date Of Birth          1965        Visit Information        Provider Department      2/14/2018 11:45 AM Roshni Arboleda, PT [facility]        Today's Diagnoses     Strain of right levator scapulae muscle        Pain of right hip joint        Chronic right-sided low back pain without sciatica           Follow-ups after your visit        Your next 10 appointments already scheduled     Mar 07, 2018  8:25 AM CST   [facility] Running with Roshni Arboleda, PT   [facility] ([facility] [place]  )    [address] [place] [address]   [phone]            Mar 28, 2018  8:25 AM CDT   [facility] Running with Roshni Arboleda, PT   [facility] ([facility] [place]  )    [address] [place] [address]   [phone]            Apr 18, 2018  8:25 AM CDT   [facility] Running with Roshni Arboleda, PT   [facility] ([facility] [place]  )    [address] [place] [address]   [phone]            May 09, 2018  8:25 AM CDT   [facility] Running with Roshni Arboleda, PT   [facility] ([facility] [place]  )    [address] [place] [address]   [phone]              Who to contact     If you have questions or need follow up information about today's clinic visit or your schedule please contact [facility] directly at [phone].  Normal or non-critical lab and imaging results will be communicated to you by AVM Biotechnologyhart, letter or phone within 4 business days after the clinic has received the results. If you do not hear from us within 7 days, please contact the clinic through AVM Biotechnologyhart or phone. If you have a critical or abnormal lab result, we will notify you by phone as soon as possible.  Submit refill requests through Welzoo or call your pharmacy and they will forward the refill request to  "us. Please allow 3 business days for your refill to be completed.          Additional Information About Your Visit        MyChart Information     MeetLinkshare lets you send messages to your doctor, view your test results, renew your prescriptions, schedule appointments and more. To sign up, go to [email or web address] . Click on \"Log in\" on the left side of the screen, which will take you to the Welcome page. Then click on \"Sign up Now\" on the right side of the page.     You will be asked to enter the access code listed below, as well as some personal information. Please follow the directions to create your username and password.     Your access code is: [email or web address]  Expires: 3/21/2018 11:23 AM     Your access code will  in 90 days. If you need help or a new code, please call your [facility] clinic or [phone].        Care EveryWhere ID     This is your Care EveryWhere ID. This could be used by other organizations to access your [facility] medical records  [id number]         Blood Pressure from Last 3 Encounters:   No data found for BP    Weight from Last 3 Encounters:   No data found for Wt              We Performed the Following     [facility] PROGRESS NOTES REPORT     NEUROMUSCULAR RE-EDUCATION     THERAPEUTIC EXERCISES        Primary Care Provider Office Phone # Fax #    Braulio Deshpande [phone]       [facility] [address] [facility] [address]        Equal Access to Services     [facility]: Hadii antione lima Sostaci, waaxda luqadaha, qaybta kaalmada adegustavoyada, baljeet leija . So [facility] [phone].    ATENCIÓN: Si habla español, tiene a elmore disposición servicios gratuitos de asistencia lingüística. Llame al [phone].    We comply with applicable federal civil rights laws and [place] laws. We do not discriminate on the basis of race, color, national origin, age, disability, sex, sexual orientation, or gender identity.          "   Thank you!     Thank you for choosing [facility]  for your care. Our goal is always to provide you with excellent care. Hearing back from our patients is one way we can continue to improve our services. Please take a few minutes to complete the written survey that you may receive in the mail after your visit with us. Thank you!             Your Updated Medication List - Protect others around you: Learn how to safely use, store and throw away your medicines at www.disposemymeds.org.      Notice  As of 2/14/2018  1:05 PM    You have not been prescribed any medications.

## 2018-03-07 ENCOUNTER — THERAPY VISIT (OUTPATIENT)
Dept: PHYSICAL THERAPY | Facility: CLINIC | Age: 53
End: 2018-03-07
Payer: COMMERCIAL

## 2018-03-07 DIAGNOSIS — M54.50 CHRONIC RIGHT-SIDED LOW BACK PAIN WITHOUT SCIATICA: ICD-10-CM

## 2018-03-07 DIAGNOSIS — S46.811A STRAIN OF RIGHT LEVATOR SCAPULAE MUSCLE: ICD-10-CM

## 2018-03-07 DIAGNOSIS — G89.29 CHRONIC RIGHT-SIDED LOW BACK PAIN WITHOUT SCIATICA: ICD-10-CM

## 2018-03-07 DIAGNOSIS — M25.551 PAIN OF RIGHT HIP JOINT: ICD-10-CM

## 2018-03-07 PROCEDURE — 97112 NEUROMUSCULAR REEDUCATION: CPT | Mod: GP | Performed by: PHYSICAL THERAPIST

## 2018-03-07 PROCEDURE — 97110 THERAPEUTIC EXERCISES: CPT | Mod: GP | Performed by: PHYSICAL THERAPIST

## 2018-03-26 ENCOUNTER — THERAPY VISIT (OUTPATIENT)
Dept: PHYSICAL THERAPY | Facility: CLINIC | Age: 53
End: 2018-03-26
Payer: COMMERCIAL

## 2018-03-26 DIAGNOSIS — M25.551 PAIN OF RIGHT HIP JOINT: ICD-10-CM

## 2018-03-26 DIAGNOSIS — S46.811A STRAIN OF RIGHT LEVATOR SCAPULAE MUSCLE: ICD-10-CM

## 2018-03-26 DIAGNOSIS — M54.50 CHRONIC RIGHT-SIDED LOW BACK PAIN WITHOUT SCIATICA: ICD-10-CM

## 2018-03-26 DIAGNOSIS — G89.29 CHRONIC RIGHT-SIDED LOW BACK PAIN WITHOUT SCIATICA: ICD-10-CM

## 2018-03-26 PROCEDURE — 97112 NEUROMUSCULAR REEDUCATION: CPT | Mod: GP | Performed by: PHYSICAL THERAPIST

## 2018-03-26 PROCEDURE — 97110 THERAPEUTIC EXERCISES: CPT | Mod: GP | Performed by: PHYSICAL THERAPIST

## 2018-04-18 ENCOUNTER — THERAPY VISIT (OUTPATIENT)
Dept: PHYSICAL THERAPY | Facility: CLINIC | Age: 53
End: 2018-04-18
Payer: COMMERCIAL

## 2018-04-18 DIAGNOSIS — M54.50 CHRONIC RIGHT-SIDED LOW BACK PAIN WITHOUT SCIATICA: ICD-10-CM

## 2018-04-18 DIAGNOSIS — M25.551 PAIN OF RIGHT HIP JOINT: ICD-10-CM

## 2018-04-18 DIAGNOSIS — S46.811A STRAIN OF RIGHT LEVATOR SCAPULAE MUSCLE: ICD-10-CM

## 2018-04-18 DIAGNOSIS — G89.29 CHRONIC RIGHT-SIDED LOW BACK PAIN WITHOUT SCIATICA: ICD-10-CM

## 2018-04-18 PROCEDURE — 97140 MANUAL THERAPY 1/> REGIONS: CPT | Mod: GP | Performed by: PHYSICAL THERAPIST

## 2018-04-18 PROCEDURE — 97112 NEUROMUSCULAR REEDUCATION: CPT | Mod: GP | Performed by: PHYSICAL THERAPIST

## 2018-04-18 PROCEDURE — 97110 THERAPEUTIC EXERCISES: CPT | Mod: GP | Performed by: PHYSICAL THERAPIST

## 2018-05-09 ENCOUNTER — THERAPY VISIT (OUTPATIENT)
Dept: PHYSICAL THERAPY | Facility: CLINIC | Age: 53
End: 2018-05-09
Payer: COMMERCIAL

## 2018-05-09 DIAGNOSIS — M54.50 CHRONIC RIGHT-SIDED LOW BACK PAIN WITHOUT SCIATICA: ICD-10-CM

## 2018-05-09 DIAGNOSIS — G89.29 CHRONIC RIGHT-SIDED LOW BACK PAIN WITHOUT SCIATICA: ICD-10-CM

## 2018-05-09 DIAGNOSIS — S46.811A STRAIN OF RIGHT LEVATOR SCAPULAE MUSCLE: ICD-10-CM

## 2018-05-09 DIAGNOSIS — M25.551 PAIN OF RIGHT HIP JOINT: ICD-10-CM

## 2018-05-09 PROCEDURE — 97110 THERAPEUTIC EXERCISES: CPT | Mod: GP | Performed by: PHYSICAL THERAPIST

## 2018-05-09 PROCEDURE — 97112 NEUROMUSCULAR REEDUCATION: CPT | Mod: GP | Performed by: PHYSICAL THERAPIST

## 2018-05-09 PROCEDURE — 97140 MANUAL THERAPY 1/> REGIONS: CPT | Mod: GP | Performed by: PHYSICAL THERAPIST

## 2018-05-09 NOTE — MR AVS SNAPSHOT
After Visit Summary   5/9/2018    Dale Durán    MRN: [id number]           Patient Information     Date Of Birth          1965        Visit Information        Provider Department      5/9/2018 12:55 PM Roshni Arboleda, PT [facility]        Today's Diagnoses     Strain of right levator scapulae muscle        Pain of right hip joint        Chronic right-sided low back pain without sciatica           Follow-ups after your visit        Your next 10 appointments already scheduled     May 30, 2018  8:25 AM CDT   [facility] Running with Roshni Arboleda, PT   [facility] ([facility] [place]  )    [address] [place] [address]  [address] [place] [address]   [phone]            Jun 14, 2018  8:40 AM CDT   [facility] Running with Roshni Arboleda PT   [facility] ([facility] [place]  )    [address] [place] [address]   [phone]            Jul 11, 2018  8:25 AM CDT   [facility] Running with Roshni Arboleda PT   [facility] ([facility] [place]  )    [address] [place] [address]   [phone]              Who to contact     If you have questions or need follow up information about today's clinic visit or your schedule please contact [facility] directly at [phone].  Normal or non-critical lab and imaging results will be communicated to you by Kwarterhart, letter or phone within 4 business days after the clinic has received the results. If you do not hear from us within 7 days, please contact the clinic through Kwarterhart or phone. If you have a critical or abnormal lab result, we will notify you by phone as soon as possible.  Submit refill requests through Mobile Complete or call your pharmacy and they will forward the refill request to us. Please allow 3 business days for your refill to be completed.          Additional Information About Your Visit        Mobile Complete Information     Mobile Complete lets you send messages to your doctor, view  "your test results, renew your prescriptions, schedule appointments and more. To sign up, go to [email or web address] . Click on \"Log in\" on the left side of the screen, which will take you to the Welcome page. Then click on \"Sign up Now\" on the right side of the page.     You will be asked to enter the access code listed below, as well as some personal information. Please follow the directions to create your username and password.     Your access code is: [email or web address]  Expires: 2018 10:14 AM     Your access code will  in 90 days. If you need help or a new code, please call your [place] clinic or [phone].        Care EveryWhere ID     This is your Care EveryWhere ID. This could be used by other organizations to access your [place] medical records  [id number]         Blood Pressure from Last 3 Encounters:   No data found for BP    Weight from Last 3 Encounters:   No data found for Wt              We Performed the Following     [facility] PROGRESS NOTES REPORT     MANUAL THER TECH,1+REGIONS,EA 15 MIN     NEUROMUSCULAR RE-EDUCATION     THERAPEUTIC EXERCISES        Primary Care Provider Office Phone # Fax #    Braulio Deshpande [phone]       [facility] [address] [facility] [address]        Equal Access to Services     BRYCE ARORA : Hadii antione ku hadasho Soomaali, waaxda luqadaha, qaybta kaalmada adeegyada, baljeet syed haystevenn marlys wright. So [facility] [phone].    ATENCIÓN: Si habla español, tiene a elmore disposición servicios gratuitos de asistencia lingüística. [facility] [phone].    We comply with applicable federal civil rights laws and [place] laws. We do not discriminate on the basis of race, color, national origin, age, disability, sex, sexual orientation, or gender identity.            Thank you!     Thank you for choosing [facility]  for your care. Our goal is always to provide you with excellent care. Hearing back from our " patients is one way we can continue to improve our services. Please take a few minutes to complete the written survey that you may receive in the mail after your visit with us. Thank you!             Your Updated Medication List - Protect others around you: Learn how to safely use, store and throw away your medicines at www.disposemymeds.org.      Notice  As of 5/9/2018 11:59 PM    You have not been prescribed any medications.

## 2018-05-10 NOTE — PROGRESS NOTES
Subjective:  HPI                    Objective:  System    Physical Exam    General     ROS    Assessment/Plan:    PROGRESS  REPORT    Progress reporting period is from 05/09/18.       SUBJECTIVE  Subjective changes noted by patient:   R neck feeling better with massage and chiro. Been doing run program 3 x week + spin. Also L foot sensation came back and foam rolled went away. 80% better overall in neck/shoulder. Good JAYDEN session and immediate relief after doing L rotation. Wearing SI loc belt.     Current pain level is 3/10  .     Previous pain level was  5/10  .   Changes in function:  Yes (See Goal flowsheet attached for changes in current functional level)  Adverse reaction to treatment or activity: None    OBJECTIVE  Changes noted in objective findings:  Yes,   Objective: CROM L rotation min loss, all others WNL. Overuse of anterior neck musculature with head lift. Overuse L UT with all ROM end range. Trial of KT taping for UT inihibition. R QL increased activity with any use of R UE. Posture improving. R knee pain with stepdowns first thing in morning is also improving.      ASSESSMENT/PLAN  Updated problem list and treatment plan: Diagnosis 1:  Neck and R LBP  Pain -  hot/cold therapy, manual therapy, splint/taping/bracing/orthotics, self management, education, directional preference exercise and home program  Decreased ROM/flexibility - manual therapy and therapeutic exercise  Decreased joint mobility - manual therapy and therapeutic exercise  Decreased strength - therapeutic exercise and therapeutic activities  Decreased proprioception - neuro re-education and therapeutic activities  Inflammation - cold therapy and self management/home program  Impaired muscle performance - neuro re-education  Decreased function - therapeutic activities  Impaired posture - neuro re-education  STG/LTGs have been met or progress has been made towards goals:  Yes (See Goal flow sheet completed today.)  Assessment of Progress:  The patient's condition is improving.  Self Management Plans:  Patient has been instructed in a home treatment program.  Patient  has been instructed in self management of symptoms.    Dale continues to require the following intervention to meet STG and LTG's:  PT    Recommendations:  This patient would benefit from continued therapy.     Frequency:  1 X a month, once daily  Duration:  for 2 months        Please refer to the daily flowsheet for treatment today, total treatment time and time spent performing 1:1 timed codes.

## 2018-05-30 ENCOUNTER — THERAPY VISIT (OUTPATIENT)
Dept: PHYSICAL THERAPY | Facility: CLINIC | Age: 53
End: 2018-05-30
Payer: COMMERCIAL

## 2018-05-30 DIAGNOSIS — M25.551 PAIN OF RIGHT HIP JOINT: ICD-10-CM

## 2018-05-30 DIAGNOSIS — G89.29 CHRONIC RIGHT-SIDED LOW BACK PAIN WITHOUT SCIATICA: ICD-10-CM

## 2018-05-30 DIAGNOSIS — S46.811A STRAIN OF RIGHT LEVATOR SCAPULAE MUSCLE: ICD-10-CM

## 2018-05-30 DIAGNOSIS — M54.50 CHRONIC RIGHT-SIDED LOW BACK PAIN WITHOUT SCIATICA: ICD-10-CM

## 2018-05-30 PROCEDURE — 97112 NEUROMUSCULAR REEDUCATION: CPT | Mod: GP | Performed by: PHYSICAL THERAPIST

## 2018-05-30 PROCEDURE — 97110 THERAPEUTIC EXERCISES: CPT | Mod: GP | Performed by: PHYSICAL THERAPIST

## 2018-05-30 PROCEDURE — 97140 MANUAL THERAPY 1/> REGIONS: CPT | Mod: GP | Performed by: PHYSICAL THERAPIST

## 2018-06-14 ENCOUNTER — THERAPY VISIT (OUTPATIENT)
Dept: PHYSICAL THERAPY | Facility: CLINIC | Age: 53
End: 2018-06-14
Payer: COMMERCIAL

## 2018-06-14 DIAGNOSIS — M54.50 CHRONIC RIGHT-SIDED LOW BACK PAIN WITHOUT SCIATICA: ICD-10-CM

## 2018-06-14 DIAGNOSIS — S46.811A STRAIN OF RIGHT LEVATOR SCAPULAE MUSCLE: ICD-10-CM

## 2018-06-14 DIAGNOSIS — M25.551 PAIN OF RIGHT HIP JOINT: ICD-10-CM

## 2018-06-14 DIAGNOSIS — G89.29 CHRONIC RIGHT-SIDED LOW BACK PAIN WITHOUT SCIATICA: ICD-10-CM

## 2018-06-14 PROCEDURE — 97110 THERAPEUTIC EXERCISES: CPT | Mod: GP | Performed by: PHYSICAL THERAPIST

## 2018-06-14 PROCEDURE — 97530 THERAPEUTIC ACTIVITIES: CPT | Mod: GP | Performed by: PHYSICAL THERAPIST

## 2018-06-14 PROCEDURE — 97112 NEUROMUSCULAR REEDUCATION: CPT | Mod: GP | Performed by: PHYSICAL THERAPIST

## 2018-07-12 ENCOUNTER — THERAPY VISIT (OUTPATIENT)
Dept: PHYSICAL THERAPY | Facility: CLINIC | Age: 53
End: 2018-07-12
Payer: COMMERCIAL

## 2018-07-12 DIAGNOSIS — M25.551 PAIN OF RIGHT HIP JOINT: ICD-10-CM

## 2018-07-12 DIAGNOSIS — S46.811A STRAIN OF RIGHT LEVATOR SCAPULAE MUSCLE: ICD-10-CM

## 2018-07-12 DIAGNOSIS — G89.29 CHRONIC RIGHT-SIDED LOW BACK PAIN WITHOUT SCIATICA: ICD-10-CM

## 2018-07-12 DIAGNOSIS — M54.50 CHRONIC RIGHT-SIDED LOW BACK PAIN WITHOUT SCIATICA: ICD-10-CM

## 2018-07-12 PROCEDURE — 97140 MANUAL THERAPY 1/> REGIONS: CPT | Mod: GP | Performed by: PHYSICAL THERAPIST

## 2018-07-12 PROCEDURE — 97110 THERAPEUTIC EXERCISES: CPT | Mod: GP | Performed by: PHYSICAL THERAPIST

## 2018-07-12 PROCEDURE — 97112 NEUROMUSCULAR REEDUCATION: CPT | Mod: GP | Performed by: PHYSICAL THERAPIST

## 2018-07-12 NOTE — MR AVS SNAPSHOT
After Visit Summary   7/12/2018    Dale Durán    MRN: [id number]           Patient Information     Date Of Birth          1965        Visit Information        Provider Department      7/12/2018 8:00 AM Roshni Arboleda, PT [facility]        Today's Diagnoses     Strain of right levator scapulae muscle        Pain of right hip joint        Chronic right-sided low back pain without sciatica           Follow-ups after your visit        Your next 10 appointments already scheduled     Aug 03, 2018  8:25 AM CDT   [facility] ([facility] [place]  )    [address] [facility]  [address] [place] [address]   [phone]            Aug 22, 2018  8:25 AM CDT   [facility] ([facility]  )    [address] [address] [address]   [phone]              Who to contact     If you have questions or need follow up information about today's clinic visit or your schedule please contact [facility] directly at [phone].  Normal or non-critical lab and imaging results will be communicated to you by MyChart, letter or phone within 4 business days after the clinic has received the results. If you do not hear from us within 7 days, please contact the clinic through MyChart or phone. If you have a critical or abnormal lab result, we will notify you by phone as soon as possible.  Submit refill requests through Twinklrt or call your pharmacy and they will forward the refill request to us. Please allow 3 business days for your refill to be completed.          Additional Information About Your Visit        Care EveryWhere ID     This is your Care EveryWhere ID. This could be used by other organizations to access your [place] medical records  [id number]         Blood Pressure from Last 3 Encounters:   No data found for BP    Weight from Last 3 Encounters:   No data  found for Wt              We Performed the Following     [facility] PROGRESS NOTES REPORT     MANUAL THER TECH,1+REGIONS,EA 15 MIN     NEUROMUSCULAR RE-EDUCATION     THERAPEUTIC EXERCISES        Primary Care Provider Office Phone # Fax #    Braulio Deshpande [phone]       [facility] [address]  [facility] [address]        Equal Access to Services     [facility]: Hadii aad ku hadasho Soomaali, waaxda luqadaha, qaybta kaalmada adeegyada, waxay idiin hayaan adeeg kharash la'aan . So [facility] [phone].    ATENCIÓN: Si habla español, tiene a elmore disposición servicios gratuitos de asistencia lingüística. Llame al [phone].    We comply with applicable federal civil rights laws and [place] laws. We do not discriminate on the basis of race, color, national origin, age, disability, sex, sexual orientation, or gender identity.            Thank you!     Thank you for choosing [facility]  for your care. Our goal is always to provide you with excellent care. Hearing back from our patients is one way we can continue to improve our services. Please take a few minutes to complete the written survey that you may receive in the mail after your visit with us. Thank you!             Your Updated Medication List - Protect others around you: Learn how to safely use, store and throw away your medicines at www.disposemymeds.org.      Notice  As of 7/12/2018 12:50 PM    You have not been prescribed any medications.

## 2018-08-21 ENCOUNTER — THERAPY VISIT (OUTPATIENT)
Dept: PHYSICAL THERAPY | Facility: CLINIC | Age: 53
End: 2018-08-21
Payer: COMMERCIAL

## 2018-08-21 DIAGNOSIS — M54.50 CHRONIC RIGHT-SIDED LOW BACK PAIN WITHOUT SCIATICA: ICD-10-CM

## 2018-08-21 DIAGNOSIS — M25.551 PAIN OF RIGHT HIP JOINT: ICD-10-CM

## 2018-08-21 DIAGNOSIS — S46.811A STRAIN OF RIGHT LEVATOR SCAPULAE MUSCLE: ICD-10-CM

## 2018-08-21 DIAGNOSIS — G89.29 CHRONIC RIGHT-SIDED LOW BACK PAIN WITHOUT SCIATICA: ICD-10-CM

## 2018-08-21 PROCEDURE — 97110 THERAPEUTIC EXERCISES: CPT | Mod: GP | Performed by: PHYSICAL THERAPIST

## 2018-08-21 PROCEDURE — 97112 NEUROMUSCULAR REEDUCATION: CPT | Mod: GP | Performed by: PHYSICAL THERAPIST

## 2018-09-12 ENCOUNTER — THERAPY VISIT (OUTPATIENT)
Dept: PHYSICAL THERAPY | Facility: CLINIC | Age: 53
End: 2018-09-12
Payer: COMMERCIAL

## 2018-09-12 DIAGNOSIS — G89.29 CHRONIC RIGHT-SIDED LOW BACK PAIN WITHOUT SCIATICA: ICD-10-CM

## 2018-09-12 DIAGNOSIS — S46.811A STRAIN OF RIGHT LEVATOR SCAPULAE MUSCLE: ICD-10-CM

## 2018-09-12 DIAGNOSIS — M54.50 CHRONIC RIGHT-SIDED LOW BACK PAIN WITHOUT SCIATICA: ICD-10-CM

## 2018-09-12 DIAGNOSIS — M25.551 PAIN OF RIGHT HIP JOINT: ICD-10-CM

## 2018-09-12 PROCEDURE — 97110 THERAPEUTIC EXERCISES: CPT | Mod: GP | Performed by: PHYSICAL THERAPIST

## 2018-09-12 PROCEDURE — 97112 NEUROMUSCULAR REEDUCATION: CPT | Mod: GP | Performed by: PHYSICAL THERAPIST

## 2018-09-12 NOTE — MR AVS SNAPSHOT
"              After Visit Summary   9/12/2018    Dale Durán    MRN: [id number]           Patient Information     Date Of Birth          1965        Visit Information        Provider Department      9/12/2018 8:25 AM Roshni Arboleda, [facility]        Today's Diagnoses     Strain of right levator scapulae muscle        Pain of right hip joint        Chronic right-sided low back pain without sciatica           Follow-ups after your visit        Your next 10 appointments already scheduled     Oct 03, 2018  8:25 AM GLENNT   [facility] ([facility]  )    [address]  [facility] [address]   [phone]              Who to contact     If you have questions or need follow up information about today's clinic visit or your schedule please contact [facility] directly at [phone].  Normal or non-critical lab and imaging results will be communicated to you by MyChart, letter or phone within 4 business days after the clinic has received the results. If you do not hear from us within 7 days, please contact the clinic through MyChart or phone. If you have a critical or abnormal lab result, we will notify you by phone as soon as possible.  Submit refill requests through [employer] or call your pharmacy and they will forward the refill request to us. Please allow 3 business days for your refill to be completed.          Additional Information About Your Visit        MyChart Information     [employer] lets you send messages to your doctor, view your test results, renew your prescriptions, schedule appointments and more. To sign up, go to [email or web address] . Click on \"Log in\" on the left side of the screen, which will take you to the Welcome page. Then click on \"Sign up Now\" on the right side of the page.     You will be asked to enter the access code listed below, as well as some personal information. Please follow " the directions to create your username and password.     Your access code is: [email or web address]  Expires: 2018  1:11 PM     Your access code will  in 90 days. If you need help or a new code, please call your [place] clinic or [phone].        Care EveryWhere ID     This is your Care EveryWhere ID. This could be used by other organizations to access your [place] medical records  [id number]         Blood Pressure from Last 3 Encounters:   No data found for BP    Weight from Last 3 Encounters:   No data found for Wt              We Performed the Following     [facility] PROGRESS NOTES REPORT     NEUROMUSCULAR RE-EDUCATION     THERAPEUTIC EXERCISES        Primary Care Provider Office Phone # Fax #    Braulio Deshpande [phone]       [facility] [address] [facility] [address]        Equal Access to Services     [facility] : Hadii aad ku hadasho Soomaali, waaxda luqadaha, qaybta kaalmada adeegyada, baljeet syed hayaan adegustavo leija . So [facility] [phone].    ATENCIÓN: Si habla español, tiene a elmore disposición servicios gratuitos de asistencia lingüística. Jackie al [phone].    We comply with applicable federal civil rights laws and [place] laws. We do not discriminate on the basis of race, color, national origin, age, disability, sex, sexual orientation, or gender identity.            Thank you!     Thank you for choosing [facility]  for your care. Our goal is always to provide you with excellent care. Hearing back from our patients is one way we can continue to improve our services. Please take a few minutes to complete the written survey that you may receive in the mail after your visit with us. Thank you!             Your Updated Medication List - Protect others around you: Learn how to safely use, store and throw away your medicines at www.disposemymeds.org.      Notice  As of 2018 11:59 PM    You have not been prescribed any medications.

## 2018-09-18 NOTE — PROGRESS NOTES
Subjective:  HPI                    Objective:  System    Physical Exam    General     ROS    Assessment/Plan:    PROGRESS  REPORT    Progress reporting period is from 09/12/18.       SUBJECTIVE  Subjective changes noted by patient:  .  Subjective: R side neck the biggest problem. Feels like constant strain. A little weakness in R hand on all fingers/on/off.Sees chiro and started [place] home traction. Also doing [employer] for 30 seconds over 20 minutes. Used 20-25# with traction at home. Feels like the eye movement and R tricep twitching is less. R knee slight soreness only with descending stairs in a.m. R LB overall holding steady with sit/stand desk at work.     Current pain level is 2/10  .     Previous pain level was  6/10  .   Changes in function:  Yes (See Goal flowsheet attached for changes in current functional level)  Adverse reaction to treatment or activity: None    OBJECTIVE  Changes noted in objective findings:  Yes,   Objective: Discussed traction use at home 3-4 x week, R knee sore with down stairs, run ~ 20-25 minutes.CROM  flexion WNL, B SB min loss, B rotation min loss, extension mod to max loss. With cat to cow lumbar position in all 4's, patient feels R QL strain and has R shoulder dropping.      ASSESSMENT/PLAN  Updated problem list and treatment plan: Diagnosis 1:  Neck/LBP/knee pain  Pain -  manual therapy, splint/taping/bracing/orthotics, self management, education, directional preference exercise and home program  Decreased ROM/flexibility - manual therapy and therapeutic exercise  Decreased joint mobility - manual therapy and therapeutic exercise  Decreased strength - therapeutic exercise and therapeutic activities  Decreased proprioception - neuro re-education and therapeutic activities  Inflammation - self management/home program  Impaired muscle performance - neuro re-education  Decreased function - therapeutic activities  Impaired posture - neuro re-education  STG/LTGs have been met or  progress has been made towards goals:  Yes (See Goal flow sheet completed today.)  Assessment of Progress: The patient's condition is improving.  Self Management Plans:  Patient has been instructed in a home treatment program.  Patient  has been instructed in self management of symptoms.    Dale continues to require the following intervention to meet STG and LTG's:  PT    Recommendations:  This patient would benefit from continued therapy.     Frequency:  1   X a month, once daily  Duration:  for 2 months        Please refer to the daily flowsheet for treatment today, total treatment time and time spent performing 1:1 timed codes.

## 2018-10-03 ENCOUNTER — THERAPY VISIT (OUTPATIENT)
Dept: PHYSICAL THERAPY | Facility: CLINIC | Age: 53
End: 2018-10-03
Payer: COMMERCIAL

## 2018-10-03 DIAGNOSIS — S46.811A STRAIN OF RIGHT LEVATOR SCAPULAE MUSCLE: ICD-10-CM

## 2018-10-03 DIAGNOSIS — M54.50 CHRONIC RIGHT-SIDED LOW BACK PAIN WITHOUT SCIATICA: ICD-10-CM

## 2018-10-03 DIAGNOSIS — G89.29 CHRONIC RIGHT-SIDED LOW BACK PAIN WITHOUT SCIATICA: ICD-10-CM

## 2018-10-03 DIAGNOSIS — M25.551 PAIN OF RIGHT HIP JOINT: ICD-10-CM

## 2018-10-03 PROCEDURE — 97140 MANUAL THERAPY 1/> REGIONS: CPT | Mod: GP | Performed by: PHYSICAL THERAPIST

## 2018-10-03 PROCEDURE — 97112 NEUROMUSCULAR REEDUCATION: CPT | Mod: GP | Performed by: PHYSICAL THERAPIST

## 2018-10-03 PROCEDURE — 97110 THERAPEUTIC EXERCISES: CPT | Mod: GP | Performed by: PHYSICAL THERAPIST

## 2018-10-16 ENCOUNTER — THERAPY VISIT (OUTPATIENT)
Dept: PHYSICAL THERAPY | Facility: CLINIC | Age: 53
End: 2018-10-16
Payer: COMMERCIAL

## 2018-10-16 DIAGNOSIS — G89.29 CHRONIC RIGHT-SIDED LOW BACK PAIN WITHOUT SCIATICA: ICD-10-CM

## 2018-10-16 DIAGNOSIS — M25.551 PAIN OF RIGHT HIP JOINT: ICD-10-CM

## 2018-10-16 DIAGNOSIS — S46.811A STRAIN OF RIGHT LEVATOR SCAPULAE MUSCLE: ICD-10-CM

## 2018-10-16 DIAGNOSIS — M54.50 CHRONIC RIGHT-SIDED LOW BACK PAIN WITHOUT SCIATICA: ICD-10-CM

## 2018-10-16 PROCEDURE — 97535 SELF CARE MNGMENT TRAINING: CPT | Mod: GP | Performed by: PHYSICAL THERAPIST

## 2018-10-16 PROCEDURE — 97110 THERAPEUTIC EXERCISES: CPT | Mod: GP | Performed by: PHYSICAL THERAPIST

## 2018-10-16 PROCEDURE — 97112 NEUROMUSCULAR REEDUCATION: CPT | Mod: GP | Performed by: PHYSICAL THERAPIST

## 2018-11-06 ENCOUNTER — THERAPY VISIT (OUTPATIENT)
Dept: PHYSICAL THERAPY | Facility: CLINIC | Age: 53
End: 2018-11-06
Payer: COMMERCIAL

## 2018-11-06 DIAGNOSIS — M54.50 CHRONIC RIGHT-SIDED LOW BACK PAIN WITHOUT SCIATICA: ICD-10-CM

## 2018-11-06 DIAGNOSIS — S46.811A STRAIN OF RIGHT LEVATOR SCAPULAE MUSCLE: ICD-10-CM

## 2018-11-06 DIAGNOSIS — M25.551 PAIN OF RIGHT HIP JOINT: ICD-10-CM

## 2018-11-06 DIAGNOSIS — G89.29 CHRONIC RIGHT-SIDED LOW BACK PAIN WITHOUT SCIATICA: ICD-10-CM

## 2018-11-06 PROCEDURE — 97140 MANUAL THERAPY 1/> REGIONS: CPT | Mod: GP | Performed by: PHYSICAL THERAPIST

## 2018-11-06 PROCEDURE — 97535 SELF CARE MNGMENT TRAINING: CPT | Mod: GP | Performed by: PHYSICAL THERAPIST

## 2018-11-06 PROCEDURE — 97110 THERAPEUTIC EXERCISES: CPT | Mod: GP | Performed by: PHYSICAL THERAPIST

## 2018-12-03 ENCOUNTER — THERAPY VISIT (OUTPATIENT)
Dept: PHYSICAL THERAPY | Facility: CLINIC | Age: 53
End: 2018-12-03
Payer: COMMERCIAL

## 2018-12-03 DIAGNOSIS — G89.29 CHRONIC RIGHT-SIDED LOW BACK PAIN WITHOUT SCIATICA: ICD-10-CM

## 2018-12-03 DIAGNOSIS — M54.50 CHRONIC RIGHT-SIDED LOW BACK PAIN WITHOUT SCIATICA: ICD-10-CM

## 2018-12-03 DIAGNOSIS — M25.551 PAIN OF RIGHT HIP JOINT: ICD-10-CM

## 2018-12-03 DIAGNOSIS — S46.811A STRAIN OF RIGHT LEVATOR SCAPULAE MUSCLE: ICD-10-CM

## 2018-12-03 PROCEDURE — 97164 PT RE-EVAL EST PLAN CARE: CPT | Mod: GP | Performed by: PHYSICAL THERAPIST

## 2018-12-03 PROCEDURE — 97535 SELF CARE MNGMENT TRAINING: CPT | Mod: GP | Performed by: PHYSICAL THERAPIST

## 2018-12-03 PROCEDURE — 97110 THERAPEUTIC EXERCISES: CPT | Mod: GP | Performed by: PHYSICAL THERAPIST

## 2018-12-03 NOTE — PROGRESS NOTES
"Subjective:  HPI                    Objective:  System    Physical Exam    General     ROS    Assessment/Plan:    PROGRESS  REPORT    Progress reporting period is from 12/03/18.       SUBJECTIVE  Subjective changes noted by patient:  .  Subjective: LB spasms on Saturday \"Clamp down feeling. Likely started after having done dead lifts and also being bent over changing water in hot tub for 20 minutes. Couldn't get off floor. Ibuprofen/ice, tried to keep moving. Calming down now. Denied any LE symptoms. Doing 2 spins and 1 run/week with his regular workouts (6x week). No change in sxs in L foot. Feels a constant dull sensation in whole bottom of L foot. R toes starting to notice as well. R QL is holding pretty steady. Does report R hand fatigue/bruised/stiffness feeling in entire hand/fingers/wrist. Denies any R UE sxs. Tricep twitching is now gone. Got some lifting straps to try to help with working out. Thinks maybe due to overuse with mouse/phone at work. Went back to small side of pillow. Some R sides strain yet but overall pretty good.     Current pain level is 3/10  .     Previous pain level was  7/10  .   Changes in function:  Yes (See Goal flowsheet attached for changes in current functional level)  Adverse reaction to treatment or activity: activity - bending over    OBJECTIVE  Changes noted in objective findings:    Objective: CROM NE on R hand. Slight loss extension and B rotation. Lumbar RFIS increased L foot sxs and produced L lower leg sxs, W. HANNAH decreased L lower leg sxs, NE on foot. Some increased L LE with return to neutral from extension. REIL decreased L lower leg sxs. Modified HEP to avoid flexion activities, do 40 press-ups daily, bird dog on ball, side planks on ball. Try mouse (vertical ) or L hand mousing.      ASSESSMENT/PLAN  Updated problem list and treatment plan: Diagnosis 1:  Neck pain  Pain -  hot/cold therapy, manual therapy, splint/taping/bracing/orthotics, self management, education, " directional preference exercise and home program  Decreased ROM/flexibility - manual therapy and therapeutic exercise  Decreased joint mobility - manual therapy and therapeutic exercise  Decreased strength - therapeutic exercise and therapeutic activities  Impaired muscle performance - neuro re-education  Decreased function - therapeutic activities  Impaired posture - neuro re-education  Diagnosis 2:  LBP   Pain -  hot/cold therapy, manual therapy, splint/taping/bracing/orthotics, self management, education and home program  Decreased ROM/flexibility - manual therapy and therapeutic exercise  Decreased joint mobility - manual therapy and therapeutic exercise  Decreased strength - therapeutic exercise and therapeutic activities  Decreased proprioception - neuro re-education and therapeutic activities  Inflammation - cold therapy and self management/home program  Impaired muscle performance - neuro re-education  Decreased function - therapeutic activities  Impaired posture - neuro re-education  STG/LTGs have been met or progress has been made towards goals:  Yes (See Goal flow sheet completed today.)  Assessment of Progress: The patient's condition is improving.  Self Management Plans:  Patient has been instructed in a home treatment program.  Patient  has been instructed in self management of symptoms.    Dale continues to require the following intervention to meet STG and LTG's:  PT    Recommendations:  This patient would benefit from continued therapy.     Frequency:  1 X a month, once daily  Duration:  for 3 months        Please refer to the daily flowsheet for treatment today, total treatment time and time spent performing 1:1 timed codes.

## 2018-12-03 NOTE — MR AVS SNAPSHOT
After Visit Summary   12/3/2018    Dale Durán    MRN: [id number]           Patient Information     Date Of Birth          1965        Visit Information        Provider Department      12/3/2018 11:05 AM Roshni Arboleda, PT [facility]        Today's Diagnoses     Strain of right levator scapulae muscle        Pain of right hip joint        Chronic right-sided low back pain without sciatica           Follow-ups after your visit        Who to contact     If you have questions or need follow up information about today's clinic visit or your schedule please contact [facility] directly at [phone].  Normal or non-critical lab and imaging results will be communicated to you by MyChart, letter or phone within 4 business days after the clinic has received the results. If you do not hear from us within 7 days, please contact the clinic through MyChart or phone. If you have a critical or abnormal lab result, we will notify you by phone as soon as possible.  Submit refill requests through [employer] or call your pharmacy and they will forward the refill request to us. Please allow 3 business days for your refill to be completed.          Additional Information About Your Visit        Care EveryWhere ID     This is your Care EveryWhere ID. This could be used by other organizations to access your [place] medical records  [id number]         Blood Pressure from Last 3 Encounters:   No data found for BP    Weight from Last 3 Encounters:   No data found for Wt              We Performed the Following     HC PT RE-EVAL     CHEL[facility] PROGRESS NOTES REPORT     SELF CARE MNGMENT TRAINING     THERAPEUTIC EXERCISES        Primary Care Provider Office Phone # Fax #    Braulio Deshpande [phone]       [facility] [address]  [facility] [address]        Equal Access to Services     BRYCE ARORA AH: Delicia Charles, stacey alvarez, keely  baljeet matthew reyes wright. So [facility] [phone].    ATENCIÓN: Si habla jaime, tiene a elmore disposición servicios gratuitos de asistencia lingüística. Llame al [phone].    We comply with applicable federal civil rights laws and [place] laws. We do not discriminate on the basis of race, color, national origin, age, disability, sex, sexual orientation, or gender identity.            Thank you!     Thank you for choosing [facility]  for your care. Our goal is always to provide you with excellent care. Hearing back from our patients is one way we can continue to improve our services. Please take a few minutes to complete the written survey that you may receive in the mail after your visit with us. Thank you!             Your Updated Medication List - Protect others around you: Learn how to safely use, store and throw away your medicines at www.disposemymeds.org.      Notice  As of 12/3/2018 12:55 PM    You have not been prescribed any medications.

## 2018-12-20 ENCOUNTER — THERAPY VISIT (OUTPATIENT)
Dept: PHYSICAL THERAPY | Facility: CLINIC | Age: 53
End: 2018-12-20
Payer: COMMERCIAL

## 2018-12-20 DIAGNOSIS — M25.551 PAIN OF RIGHT HIP JOINT: ICD-10-CM

## 2018-12-20 DIAGNOSIS — G89.29 CHRONIC RIGHT-SIDED LOW BACK PAIN WITHOUT SCIATICA: ICD-10-CM

## 2018-12-20 DIAGNOSIS — S46.811A STRAIN OF RIGHT LEVATOR SCAPULAE MUSCLE: ICD-10-CM

## 2018-12-20 DIAGNOSIS — M54.50 CHRONIC RIGHT-SIDED LOW BACK PAIN WITHOUT SCIATICA: ICD-10-CM

## 2018-12-20 PROCEDURE — 97112 NEUROMUSCULAR REEDUCATION: CPT | Mod: GP | Performed by: PHYSICAL THERAPIST

## 2018-12-20 PROCEDURE — 97535 SELF CARE MNGMENT TRAINING: CPT | Mod: GP | Performed by: PHYSICAL THERAPIST

## 2018-12-20 PROCEDURE — 97110 THERAPEUTIC EXERCISES: CPT | Mod: GP | Performed by: PHYSICAL THERAPIST

## 2019-02-14 ENCOUNTER — THERAPY VISIT (OUTPATIENT)
Dept: PHYSICAL THERAPY | Facility: CLINIC | Age: 54
End: 2019-02-14
Payer: COMMERCIAL

## 2019-02-14 DIAGNOSIS — M25.551 PAIN OF RIGHT HIP JOINT: ICD-10-CM

## 2019-02-14 DIAGNOSIS — M54.50 CHRONIC RIGHT-SIDED LOW BACK PAIN WITHOUT SCIATICA: ICD-10-CM

## 2019-02-14 DIAGNOSIS — S46.811A STRAIN OF RIGHT LEVATOR SCAPULAE MUSCLE: ICD-10-CM

## 2019-02-14 DIAGNOSIS — G89.29 CHRONIC RIGHT-SIDED LOW BACK PAIN WITHOUT SCIATICA: ICD-10-CM

## 2019-02-14 PROCEDURE — 97535 SELF CARE MNGMENT TRAINING: CPT | Mod: GP | Performed by: PHYSICAL THERAPIST

## 2019-02-14 PROCEDURE — 97112 NEUROMUSCULAR REEDUCATION: CPT | Mod: GP | Performed by: PHYSICAL THERAPIST

## 2019-02-14 PROCEDURE — 97110 THERAPEUTIC EXERCISES: CPT | Mod: GP | Performed by: PHYSICAL THERAPIST

## 2019-02-15 NOTE — PROGRESS NOTES
Subjective:  HPI                    Objective:  System    Physical Exam    General     ROS    Assessment/Plan:    PROGRESS  REPORT    Progress reporting period is from 02/14/19.       SUBJECTIVE  Subjective changes noted by patient:  .  Subjective: Returns to PT (last visit on 12/20/18). Overall has been feeling pretty good as far as neck and R LB. Still doing [employer] workout, more lifting 3x week, also biking/3rd day cardio. R tricep still twitching. Sleeping now with larger bump under neck b/c was feeling better. Saw Dr. Pallas and recommended another MRI. R tricep twitching is not progressing but notices with head rotation to R. L foot parathesia also intermittent. Occasional R shoulder clunking/shifting, no pain. Notices with overhead reaching and pec work.      Current pain level is 3/10  .     Previous pain level was  5/10  .   Changes in function:  Yes (See Goal flowsheet attached for changes in current functional level)  Adverse reaction to treatment or activity: None    OBJECTIVE  Changes noted in objective findings:  Yes,   Objective: CROM WNL with end range tightness R cervical. Weakness R rotator cuff and scap control. Added horizontal abduction. Rev'd importance of scap depression/retraction with any upper body lifting. Also try using pillows/bolster under knees for sleeping.      ASSESSMENT/PLAN  Updated problem list and treatment plan: Diagnosis 1:  Neck/LB/R hip  Pain -  hot/cold therapy, manual therapy, splint/taping/bracing/orthotics, self management, education and home program  Decreased ROM/flexibility - manual therapy and therapeutic exercise  Decreased joint mobility - manual therapy and therapeutic exercise  Decreased strength - therapeutic exercise and therapeutic activities  Decreased proprioception - neuro re-education and therapeutic activities  Inflammation - cold therapy and self management/home program  Impaired gait - gait training  Impaired muscle performance - neuro  re-education  Decreased function - therapeutic activities  Impaired posture - neuro re-education  STG/LTGs have been met or progress has been made towards goals:  Yes (See Goal flow sheet completed today.)  Assessment of Progress: The patient's condition is improving.  Self Management Plans:  Patient has been instructed in a home treatment program.  Patient  has been instructed in self management of symptoms.    Dale continues to require the following intervention to meet STG and LTG's:  PT    Recommendations:  This patient would benefit from continued therapy.     Frequency:  1 X a month, once daily  Duration:  for 2 months        Please refer to the daily flowsheet for treatment today, total treatment time and time spent performing 1:1 timed codes.

## 2019-03-20 ENCOUNTER — TELEPHONE (OUTPATIENT)
Dept: PALLIATIVE MEDICINE | Facility: CLINIC | Age: 54
End: 2019-03-20

## 2019-03-20 NOTE — TELEPHONE ENCOUNTER
"Received 3- at 2:54 PM    Incoming fax from [facility] (Dr. Kenneth G. Pallas) for TPI for neck and shoulder pain. Order note states \"[facility] per his [place] physical therapist.\"   [facility]'s phone: [phone]   [facility], [facility]'s fax: [phone]  Routing to the scheduling coordinators to schedule appointment for trigger point injections. Referring [facility] lists different phone numbers for patient's home and mobile whereas [place] lists home and mobile as the same - please update if needed.  Patient s contact numbers    Home Mobile   [facility], [facility] [phone]   [place] [phone]       Linda Cloverport  Patient Representative  [facility]  "

## 2019-03-25 ENCOUNTER — OFFICE VISIT (OUTPATIENT)
Dept: PALLIATIVE MEDICINE | Facility: CLINIC | Age: 54
End: 2019-03-25
Payer: COMMERCIAL

## 2019-03-25 VITALS
HEART RATE: 52 BPM | SYSTOLIC BLOOD PRESSURE: 118 MMHG | WEIGHT: 155 LBS | OXYGEN SATURATION: 98 % | DIASTOLIC BLOOD PRESSURE: 62 MMHG

## 2019-03-25 DIAGNOSIS — M79.18 MYOFASCIAL PAIN: Primary | ICD-10-CM

## 2019-03-25 PROCEDURE — 20553 NJX 1/MLT TRIGGER POINTS 3/>: CPT | Performed by: PHYSICAL MEDICINE & REHABILITATION

## 2019-03-25 ASSESSMENT — PAIN SCALES - GENERAL: PAINLEVEL: SEVERE PAIN (7)

## 2019-03-25 NOTE — PATIENT INSTRUCTIONS
[facility]   Post Procedure Instructions    Today you had:  trigger point injections       Medications used:  lidocaine   bupivicaine             Go to the emergency room if you develop any shortness of breath    Monitor the injection sites for signs and symptoms of infection-fever, chills, redness, swelling, warmth, or drainage to areas.    You may have soreness at injection sites for up to 24 hours.    You may apply ice to the painful areas to help minimize the discomfort of the needle pokes.    Do not apply heat to sites for at least 12 hours.    You may use anti-inflammatory medications or Tylenol for pain control if necessary    Pain Clinic phone number during work hours Monday-Friday:  [phone]    After hours provider line: [phone]

## 2019-03-25 NOTE — PROGRESS NOTES
Pre procedure Diagnosis: Myofascial pain   Post procedure Diagnosis: Same  Procedure performed: Trigger point injections  Anesthesia: None  Complications: None  Operators: Nava Jones MD     Indications:   Dale Durán is a 53 year old male with a history of right sided neck and courtney-scapular pain.  Exam shows myofascial pain of the muscle groups listed below and they have tried conservative treatment including physical therapy, medications.    Options/alternatives, benefits and risks were discussed with the patient including bleeding, infection, tissue trauma and pnuemothorax.  Questions were answered to his satisfaction and he agrees to proceed. Voluntary informed consent was obtained and signed.     Vitals were reviewed: Yes  Allergies were reviewed:  Yes   Medications were reviewed:  Yes   Pre-procedure pain score: 7/10    Procedure:  After getting informed consent, a Pause for the Cause was performed.    Trigger points were identified by patient, and marked when appropriate.  The area was prepped with Chloroprep.    Using clean technique, injections were completed using a 25G, 1.5 inch needle.  After negative aspiration, injection was completed.  A total of 5 locations were injected.  When possible, tissue was retracted from the chest wall to avoid lung injury.    Muscle groups injected:  Right cervical paraspinals x 1 site  Right trapezius x 2 sites  Right levator at scapular insertion x 1 site  Right rhomboid major x 1 site    Injection solution contained:  2.5 ml of 1% lidocaine and 2.5 ml of 0.5% bupivacaine. 1 ml injected at each site.     Hemostasis was achieved, the area was cleaned, and bandaids were placed when appropriate.  The patient tolerated the procedure well.  Breath sounds were normal.      Post-procedure pain score: 7/10    Follow-up includes:   - Follow up with referring provider.      Nava Jones MD  [facility]

## 2019-03-28 ENCOUNTER — THERAPY VISIT (OUTPATIENT)
Dept: PHYSICAL THERAPY | Facility: CLINIC | Age: 54
End: 2019-03-28
Payer: COMMERCIAL

## 2019-03-28 DIAGNOSIS — G89.29 CHRONIC RIGHT-SIDED LOW BACK PAIN WITHOUT SCIATICA: ICD-10-CM

## 2019-03-28 DIAGNOSIS — S46.811A STRAIN OF RIGHT LEVATOR SCAPULAE MUSCLE: ICD-10-CM

## 2019-03-28 DIAGNOSIS — M54.50 CHRONIC RIGHT-SIDED LOW BACK PAIN WITHOUT SCIATICA: ICD-10-CM

## 2019-03-28 DIAGNOSIS — M25.551 PAIN OF RIGHT HIP JOINT: ICD-10-CM

## 2019-03-28 PROCEDURE — 97110 THERAPEUTIC EXERCISES: CPT | Mod: GP | Performed by: PHYSICAL THERAPIST

## 2019-03-28 PROCEDURE — 97140 MANUAL THERAPY 1/> REGIONS: CPT | Mod: GP | Performed by: PHYSICAL THERAPIST

## 2019-03-28 PROCEDURE — 97112 NEUROMUSCULAR REEDUCATION: CPT | Mod: GP | Performed by: PHYSICAL THERAPIST

## 2019-04-17 ENCOUNTER — TELEPHONE (OUTPATIENT)
Dept: PALLIATIVE MEDICINE | Facility: CLINIC | Age: 54
End: 2019-04-17

## 2019-04-17 NOTE — TELEPHONE ENCOUNTER
Pt called questioning how to follow up with Dr Jones. He stated he is self referral and does not need orders to schedule an injection. I explained to pt that at last injection the Dr referred him back to his referring provider. Please call pt         Kassie ZAZUETA    [facility]

## 2019-04-18 ENCOUNTER — THERAPY VISIT (OUTPATIENT)
Dept: PHYSICAL THERAPY | Facility: CLINIC | Age: 54
End: 2019-04-18
Payer: COMMERCIAL

## 2019-04-18 DIAGNOSIS — M25.551 PAIN OF RIGHT HIP JOINT: ICD-10-CM

## 2019-04-18 DIAGNOSIS — G89.29 CHRONIC RIGHT-SIDED LOW BACK PAIN WITHOUT SCIATICA: ICD-10-CM

## 2019-04-18 DIAGNOSIS — S46.811A STRAIN OF RIGHT LEVATOR SCAPULAE MUSCLE: ICD-10-CM

## 2019-04-18 DIAGNOSIS — M54.50 CHRONIC RIGHT-SIDED LOW BACK PAIN WITHOUT SCIATICA: ICD-10-CM

## 2019-04-18 PROCEDURE — 97112 NEUROMUSCULAR REEDUCATION: CPT | Mod: GP | Performed by: PHYSICAL THERAPIST

## 2019-04-18 PROCEDURE — 97110 THERAPEUTIC EXERCISES: CPT | Mod: GP | Performed by: PHYSICAL THERAPIST

## 2019-04-18 PROCEDURE — 97535 SELF CARE MNGMENT TRAINING: CPT | Mod: GP | Performed by: PHYSICAL THERAPIST

## 2019-04-18 NOTE — TELEPHONE ENCOUNTER
"Reviewed chart. Original referral order received on 3/20:    Incoming fax from [facility]. (Dr. Kenneth G. Pallas) for TPI for neck and shoulder pain. Order note states \"[facility] per his [place] physical therapist.\"      "

## 2019-04-18 NOTE — TELEPHONE ENCOUNTER
Called pt. He states that he is in the process of getting another referral. Let him know that we want to help him however we can but that, since we are not managing his overall care, the provider was not comfortable in OKing you to come back.     Pt stated understanding and appreciated the call.     Kesha Macias, REVAN, RN-BC  Patient Care Supervisor/Care Coordinator  [facility] [place]

## 2019-04-29 NOTE — PROGRESS NOTES
Subjective:  HPI                    Objective:  System    Physical Exam    General     ROS    Assessment/Plan:    PROGRESS  REPORT    Progress reporting period is from 04/18/19 .       SUBJECTIVE  Subjective changes noted by patient:  .  Subjective:  Rough week last week with R thoracolumbar pain, also R thumb and 2/3rd fingers numbness/tingling. Both intermittent in nature. R TL pain primarily with static positions and also with GINA single arm row, although has figured out a better way to do this. Twitching in R tricep very active with eye movement to R and L rotation. Home traction helped with the N/T when he started using it again. Also getting some R scalp sensation with rolling at SO area.     Current pain level is 3/10  .     Previous pain level was  5/10  .   Changes in function:  Yes (See Goal flowsheet attached for changes in current functional level)  Adverse reaction to treatment or activity: None    OBJECTIVE  Changes noted in objective findings:  Yes,   Objective: Rev'd HEP. WNL lumbar flexion, extension min loss. CROM flexion WNL, ext min loss, L rotation mod loss, R min loss. Hypmobility C2-3, 4-5. Improving core control with opp/arm leg movements. NE of CROM or Lumbar ROM on LE or UE parasthesias. L hip PROM WNL, R hip slight loss ER. Added gluteal stretch.      ASSESSMENT/PLAN  Updated problem list and treatment plan: Diagnosis 1:  Neck pain  Pain -  hot/cold therapy, manual therapy, splint/taping/bracing/orthotics, self management, education, directional preference exercise and home program  Decreased ROM/flexibility - manual therapy and therapeutic exercise  Decreased joint mobility - manual therapy and therapeutic exercise  Decreased proprioception - neuro re-education and therapeutic activities  Inflammation - cold therapy and self management/home program  Impaired muscle performance - neuro re-education  Decreased function - therapeutic activities  Impaired posture - neuro  re-education  Diagnosis 2:  R LB/hip   Pain -  hot/cold therapy, manual therapy, splint/taping/bracing/orthotics, self management, education, directional preference exercise and home program  Decreased ROM/flexibility - manual therapy and therapeutic exercise  Decreased joint mobility - manual therapy and therapeutic exercise  Decreased strength - therapeutic exercise and therapeutic activities  Decreased proprioception - neuro re-education and therapeutic activities  Inflammation - cold therapy and self management/home program  Impaired gait - gait training  Impaired muscle performance - neuro re-education  Decreased function - therapeutic activities  Impaired posture - neuro re-education  STG/LTGs have been met or progress has been made towards goals:  Yes (See Goal flow sheet completed today.)  Assessment of Progress: The patient's condition is improving.  Self Management Plans:  Patient has been instructed in a home treatment program.  Patient  has been instructed in self management of symptoms.    Dale continues to require the following intervention to meet STG and LTG's:  PT    Recommendations:  This patient would benefit from continued therapy.     Frequency:  1 X a month, once daily  Duration:  for 3 months        Please refer to the daily flowsheet for treatment today, total treatment time and time spent performing 1:1 timed codes.

## 2019-08-23 ENCOUNTER — OFFICE VISIT (OUTPATIENT)
Dept: PALLIATIVE MEDICINE | Facility: CLINIC | Age: 54
End: 2019-08-23
Payer: COMMERCIAL

## 2019-08-23 VITALS — SYSTOLIC BLOOD PRESSURE: 108 MMHG | DIASTOLIC BLOOD PRESSURE: 75 MMHG | OXYGEN SATURATION: 96 % | HEART RATE: 82 BPM

## 2019-08-23 DIAGNOSIS — M79.18 MYOFASCIAL PAIN: Primary | ICD-10-CM

## 2019-08-23 PROCEDURE — 20553 NJX 1/MLT TRIGGER POINTS 3/>: CPT | Performed by: PHYSICAL MEDICINE & REHABILITATION

## 2019-08-23 RX ORDER — TRIAMCINOLONE ACETONIDE 40 MG/ML
20 INJECTION, SUSPENSION INTRA-ARTICULAR; INTRAMUSCULAR ONCE
Status: COMPLETED | OUTPATIENT
Start: 2019-08-23 | End: 2019-08-23

## 2019-08-23 RX ORDER — BUPIVACAINE HYDROCHLORIDE 5 MG/ML
5 INJECTION, SOLUTION EPIDURAL; INTRACAUDAL ONCE
Status: COMPLETED | OUTPATIENT
Start: 2019-08-23 | End: 2019-08-23

## 2019-08-23 RX ADMIN — BUPIVACAINE HYDROCHLORIDE 25 MG: 5 INJECTION, SOLUTION EPIDURAL; INTRACAUDAL at 14:20

## 2019-08-23 RX ADMIN — TRIAMCINOLONE ACETONIDE 20 MG: 40 INJECTION, SUSPENSION INTRA-ARTICULAR; INTRAMUSCULAR at 14:20

## 2019-08-23 ASSESSMENT — PAIN SCALES - GENERAL: PAINLEVEL: SEVERE PAIN (7)

## 2019-08-23 NOTE — PATIENT INSTRUCTIONS
[facility]   Post Procedure Instructions    Today you had:  trigger point injections       Medications used:  lidocaine   bupivicaine   kenalog        Go to the emergency room if you develop any shortness of breath    Monitor the injection sites for signs and symptoms of infection-fever, chills, redness, swelling, warmth, or drainage to areas.    You may have soreness at injection sites for up to 24 hours.    You may apply ice to the painful areas to help minimize the discomfort of the needle pokes.    Do not apply heat to sites for at least 12 hours.    You may use anti-inflammatory medications or Tylenol for pain control if necessary    Pain Clinic phone number during work hours Monday-Friday:  [phone]    After hours provider line: [phone]

## 2019-08-23 NOTE — PROGRESS NOTES
Pre procedure Diagnosis: Myofascial pain   Post procedure Diagnosis: Same  Procedure performed: Trigger point injections  Anesthesia: None  Complications: None  Operators: Nava Jones MD     Indications:   Dale Durán is a 53 year old male with a history of right sided neck and courtney-scapular pain.  Exam shows myofascial pain of the muscle groups listed below and they have tried conservative treatment including physical therapy, medications.    Options/alternatives, benefits and risks were discussed with the patient including bleeding, infection, tissue trauma and pnuemothorax.  Questions were answered to his satisfaction and he agrees to proceed. Voluntary informed consent was obtained and signed.     Vitals were reviewed: Yes  Allergies were reviewed:  Yes   Medications were reviewed:  Yes   Pre-procedure pain score: 7/10    Procedure:  After getting informed consent, a Pause for the Cause was performed.    Trigger points were identified by patient, and marked when appropriate.  The area was prepped with Chloroprep.    Using clean technique, injections were completed using a 25G, 1.5 inch needle.  After negative aspiration, injection was completed.  A total of 10 locations were injected.  When possible, tissue was retracted from the chest wall to avoid lung injury.    Muscle groups injected:  Right cervical paraspinals x 1 site  Right trapezius x 4 sites  Right levator at scapular insertion x 1 site  Right rhomboid major x 2 site  Right rhomboid minor x 2 site    Injection solution contained:  5 ml of 1% lidocaine and 5 ml of 0.5% bupivacaine and 20 mg kenalog. 1 ml injected at each site.     Hemostasis was achieved, the area was cleaned, and bandaids were placed when appropriate.  The patient tolerated the procedure well.  Breath sounds were normal.      Post-procedure pain score: 7/10    Follow-up includes:   - Follow up with referring provider.      Nava Jones MD  [facility]

## 2019-09-23 PROBLEM — G89.29 CHRONIC RIGHT-SIDED LOW BACK PAIN WITHOUT SCIATICA: Status: RESOLVED | Noted: 2017-06-28 | Resolved: 2019-09-23

## 2019-09-23 PROBLEM — M54.50 CHRONIC RIGHT-SIDED LOW BACK PAIN WITHOUT SCIATICA: Status: RESOLVED | Noted: 2017-06-28 | Resolved: 2019-09-23

## 2019-09-23 PROBLEM — S46.811A STRAIN OF RIGHT LEVATOR SCAPULAE MUSCLE: Status: RESOLVED | Noted: 2017-10-04 | Resolved: 2019-09-23

## 2019-09-23 PROBLEM — M25.551 PAIN OF RIGHT HIP JOINT: Status: RESOLVED | Noted: 2017-06-28 | Resolved: 2019-09-23

## 2019-11-21 ENCOUNTER — THERAPY VISIT (OUTPATIENT)
Dept: PHYSICAL THERAPY | Facility: CLINIC | Age: 54
End: 2019-11-21
Payer: COMMERCIAL

## 2019-11-21 DIAGNOSIS — M54.2 NECK PAIN: ICD-10-CM

## 2019-11-21 DIAGNOSIS — M75.20 BICEPS TENDONITIS, UNSPECIFIED LATERALITY: Primary | ICD-10-CM

## 2019-11-21 PROCEDURE — 97161 PT EVAL LOW COMPLEX 20 MIN: CPT | Mod: GP | Performed by: PHYSICAL THERAPIST

## 2019-11-21 PROCEDURE — 97535 SELF CARE MNGMENT TRAINING: CPT | Mod: GP | Performed by: PHYSICAL THERAPIST

## 2019-11-21 PROCEDURE — 97110 THERAPEUTIC EXERCISES: CPT | Mod: GP | Performed by: PHYSICAL THERAPIST

## 2019-11-22 PROBLEM — M75.20 BICEPS TENDONITIS, UNSPECIFIED LATERALITY: Status: ACTIVE | Noted: 2019-11-22

## 2019-11-22 PROBLEM — M54.2 NECK PAIN: Status: ACTIVE | Noted: 2019-11-22

## 2019-11-22 NOTE — PROGRESS NOTES
[facility] Initial Evaluation  Subjective:  Patient returns to PT (last seen 04/2019). Continues to have R>L sided neck/upper trap soreness with prolonged positions and also onset of R>L bicep/forearm tightness. MRI 2017 disc herniation C4-5, C4-7 central/foraminal stenosis. Started with working out only (biceps/pullup type activities) but is now present with lifting/carrying daily activities. He works out regularly at home.     The history is provided by the patient. No  was used.                       Objective:  Standing Alignment:    Cervical/Thoracic:  Cervical lordosis decreased and forward head  Shoulder/UE:  Rounded shoulders                                  Cervical/Thoracic Evaluation    AROM:  AROM Cervical:    Flexion:            WNL  Extension:       Min loss  Rotation:         Left: min loss     Right: min loss  Side Bend:      Left: min loss     Right:  Min loss    Strength: WNL B elbow flexion/extension/supination/pronation/wrist flexion/extension AROM& PROM. Mild pain with resisted bicep/supination B but no weakness.   Headaches: none  Cervical Myotomes:  Cervical myotomes: R  strength #110, L #105, no pain.          C6 (Biceps):  Left: 5    Right: 5  C7 (Triceps):  Left: 5    Right: 5      DTR's:  not assessed          Cervical Dermatomes:  normal                    Cervical Palpation:  : -lateral and medial epicondylitis testing B. Mild TTP R>L distal biceps and lateral forearm.        Cervical Stability/Joint Clearing:      Left negative at: VAT    Right negative at:  VAT  Spinal Segmental Conclusions:    Level:  Hypo at C4, C5, C6, C7 and T1                                                General     ROS    Assessment/Plan:    Patient is a 54 year old male with cervical complaints.    Patient has the following significant findings with corresponding treatment plan.                Diagnosis 1:  Cervical pain  Pain -  hot/cold therapy, manual therapy,  splint/taping/bracing/orthotics, self management, education, directional preference exercise and home program  Decreased ROM/flexibility - manual therapy and therapeutic exercise  Decreased joint mobility - manual therapy and therapeutic exercise  Decreased strength - therapeutic exercise and therapeutic activities  Decreased proprioception - neuro re-education and therapeutic activities  Inflammation - self management/home program  Impaired muscle performance - neuro re-education  Decreased function - therapeutic activities  Impaired posture - neuro re-education  Diagnosis 2:  B forearm/biceps pain   Pain -  hot/cold therapy, manual therapy, splint/taping/bracing/orthotics, self management, education and home program  Decreased ROM/flexibility - manual therapy and therapeutic exercise  Decreased strength - therapeutic exercise and therapeutic activities  Decreased proprioception - neuro re-education and therapeutic activities  Inflammation - self management/home program  Impaired muscle performance - neuro re-education  Decreased function - therapeutic activities  Impaired posture - neuro re-education    Therapy Evaluation Codes:   1) History comprised of:   Personal factors that impact the plan of care:      Time since onset of symptoms.    Comorbidity factors that impact the plan of care are:      None.     Medications impacting care: None.  2) Examination of Body Systems comprised of:   Body structures and functions that impact the plan of care:      Cervical spine and Elbow.   Activity limitations that impact the plan of care are:      Driving, Lifting, Reading/Computer work, Sitting, Standing and Walking.  3) Clinical presentation characteristics are:   Evolving/Changing.  4) Decision-Making    Moderate complexity using standardized patient assessment instrument and/or measureable assessment of functional outcome.  Cumulative Therapy Evaluation is: Moderate complexity.    Previous and current functional  limitations:  (See Goal Flow Sheet for this information)    Short term and Long term goals: (See Goal Flow Sheet for this information)     Communication ability:  Patient appears to be able to clearly communicate and understand verbal and written communication and follow directions correctly.  Treatment Explanation - The following has been discussed with the patient:   RX ordered/plan of care  Anticipated outcomes  Possible risks and side effects  This patient would benefit from PT intervention to resume normal activities.   Rehab potential is excellent.    Frequency:  1 X a month, once daily  Duration:  for 3 months  Discharge Plan:  Achieve all LTG.  Independent in home treatment program.  Reach maximal therapeutic benefit.    Please refer to the daily flowsheet for treatment today, total treatment time and time spent performing 1:1 timed codes.

## 2020-03-10 PROBLEM — M54.2 NECK PAIN: Status: RESOLVED | Noted: 2019-11-22 | Resolved: 2020-03-10

## 2020-03-10 PROBLEM — M75.20 BICEPS TENDONITIS, UNSPECIFIED LATERALITY: Status: RESOLVED | Noted: 2019-11-22 | Resolved: 2020-03-10

## 2021-02-03 ENCOUNTER — TRANSFERRED RECORDS (OUTPATIENT)
Dept: HEALTH INFORMATION MANAGEMENT | Facility: CLINIC | Age: 56
End: 2021-02-03

## 2021-03-03 NOTE — TELEPHONE ENCOUNTER
FUTURE VISIT INFORMATION      FUTURE VISIT INFORMATION:    Date: 3.8.21    Time: 2:30 PM    Location: [facility]  REFERRAL INFORMATION:    Referring provider:  Dr Cale Syed    Referring providers clinic:  [facility]    Reason for visit/diagnosis: ptosis - second opinion - getting notes faxed from Dr. Syed's office    RECORDS REQUESTED FROM:       Clinic name Comments Records Status Imaging Status   [facility] 2.3.21 Dr Syed Scanned

## 2021-03-08 ENCOUNTER — OFFICE VISIT (OUTPATIENT)
Dept: OPHTHALMOLOGY | Facility: CLINIC | Age: 56
End: 2021-03-08
Payer: COMMERCIAL

## 2021-03-08 ENCOUNTER — PRE VISIT (OUTPATIENT)
Dept: OPHTHALMOLOGY | Facility: CLINIC | Age: 56
End: 2021-03-08

## 2021-03-08 ENCOUNTER — TELEPHONE (OUTPATIENT)
Dept: OPHTHALMOLOGY | Facility: CLINIC | Age: 56
End: 2021-03-08

## 2021-03-08 VITALS — WEIGHT: 160 LBS | HEIGHT: 70 IN | BODY MASS INDEX: 22.9 KG/M2

## 2021-03-08 DIAGNOSIS — H02.402 PTOSIS, LEFT EYELID: Primary | ICD-10-CM

## 2021-03-08 DIAGNOSIS — H57.813 BROW PTOSIS, BILATERAL: ICD-10-CM

## 2021-03-08 PROCEDURE — 92082 INTERMEDIATE VISUAL FIELD XM: CPT | Performed by: OPHTHALMOLOGY

## 2021-03-08 PROCEDURE — 99204 OFFICE O/P NEW MOD 45 MIN: CPT | Mod: GC | Performed by: OPHTHALMOLOGY

## 2021-03-08 PROCEDURE — 92285 EXTERNAL OCULAR PHOTOGRAPHY: CPT | Performed by: OPHTHALMOLOGY

## 2021-03-08 RX ORDER — LEVOCETIRIZINE DIHYDROCHLORIDE 5 MG/1
5 TABLET, FILM COATED ORAL EVERY EVENING
COMMUNITY

## 2021-03-08 ASSESSMENT — MIFFLIN-ST. JEOR: SCORE: 1577.01

## 2021-03-08 ASSESSMENT — TONOMETRY
OS_IOP_MMHG: 11
OD_IOP_MMHG: 14
IOP_METHOD: ICARE

## 2021-03-08 ASSESSMENT — VISUAL ACUITY
OS_PH_SC: 20/25
CORRECTION_TYPE: CONTACTS
OS_CC: 20/30
OD_CC+: -1
OD_CC: 20/20
METHOD: SNELLEN - LINEAR

## 2021-03-08 ASSESSMENT — SLIT LAMP EXAM - LIDS: COMMENTS: NORMAL

## 2021-03-08 ASSESSMENT — EXTERNAL EXAM - RIGHT EYE: OD_EXAM: BROW RECRUITMENT

## 2021-03-08 ASSESSMENT — CONF VISUAL FIELD
OD_SUPERIOR_TEMPORAL_RESTRICTION: 3
OS_NORMAL: 1
METHOD: COUNTING FINGERS

## 2021-03-08 ASSESSMENT — EXTERNAL EXAM - LEFT EYE: OS_EXAM: BROW RECRUITMENT

## 2021-03-08 NOTE — PATIENT INSTRUCTIONS
"Ptosis (Drooping Eyelids)    Eyelid ptosis (pronounced \"vicente-sis\") is a condition in which the upper eyelid droops or sags. It can affect one or both eyes. Sometimes the eyelid droops enough to obstruct the upper field of vision and/or side vision, requiring correction.??Ptosis Repair is a surgical procedure that can correct drooping eyelid(s). Depending upon the degree and cause, repair involves either resection (shortening) of a muscle in the eyelid or suspension with a muscle of the brow. Typically, the levator muscle (the major muscle responsible for elevating the upper eyelid) is shortened though an incision made along the natural crease of the lid. Excess skin weighing down the eyelid may also be removed.     Congenital Ptosis  Present from birth, the most common cause of congenital ptosis is the improper development of the levator muscle. Children may need tilt their head back or lift their eyelid with a finger to see. They may also develop amblyopia (\"lazy eye\"), strabismus (eyes that are not properly aligned), astigmatism, or blurred vision. Repair for mild to moderate congenital ptosis is generally performed between ages 3 and 5. Severe visual obstruction may require earlier treatment. ??Repair is usually performed in an outpatient surgical facility under general anesthesia so the child will not become anxious or restless during the procedure.     Acquired Ptosis  Most commonly due to age-related weakening of the levator muscle, acquired ptosis may also be caused by injury, trauma, or procedures, such as cataract surgery, which can cause weak tendons to stretch. Acquired ptosis may also be the first sign of some diseases, such as myasthenia gravis (a disorder in which the muscles become weak), or Royce's syndrome (a neurological condition that indicates injury to part of the sympathetic nervous system).? Ptosis Repair is usually performed in an outpatient surgical facility under anesthesia that induces a " "\"twilight\" state. Sedated consciousness is preferred so that Dr. Johnson can accurately adjust the eyelids.     Who Should Perform The Surgery?   When choosing a surgeon to perform ptosis surgery, look for a cosmetic and reconstructive surgeon who specializes in the eyelids, orbit, and tear drain system. Dr. Johnson's membership in the American Society of Ophthalmic Plastic and Reconstructive Surgery (ASOPRS) indicates he or she is not only a board certified ophthalmologist who knows the anatomy and structure of the eyelids and orbit, but also has had extensive training in ophthalmic plastic reconstructive and cosmetic surgery.    "

## 2021-03-08 NOTE — TELEPHONE ENCOUNTER
Met with patient to schedule his eye procedure with Dr. Johnson. Patient ask that he call me when he has his schedule in front of him.    Patient was given my direct dial [phone] to call when he ready to schedule.

## 2021-03-08 NOTE — NURSING NOTE
Chief Complaints and History of Present Illnesses   Patient presents with     Consult For     Second opinion of Ptosis of left eye.      Chief Complaint(s) and History of Present Illness(es)     Consult For     Associated symptoms: Negative for dryness, eye pain, redness and tearing    Comments: Second opinion of Ptosis of left eye.               Comments     Patient reports the left upper lids has began to droop over the past year. Patient states a friends collegue recommended Dr Rossi.   Face sleeper and tend to sleep on that eye. History with left eye includes optic pit. Waking will have some goopy matter left eye for the past year.   left eye tires more especially when driving.  left eye feel heavy. 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     Elvira Tracey, COT COT 2:38 PM March 8, 2021

## 2021-03-08 NOTE — PROGRESS NOTES
Chief Complaints and History of Present Illnesses   Patient presents with     Consult For     Second opinion of Ptosis of left eye.      Chief Complaint(s) and History of Present Illness(es)     Consult For     Associated symptoms include Negative for dryness, eye pain, redness and   tearing. Additional comments: Second opinion of Ptosis of left eye.            Comments     Patient reports the left upper lids has began to droop over the past   year. Patient states a friends collegue recommended Dr Rossi.   Face sleeper and tend to sleep on that eye. History with left eye includes   optic pit. Waking will have some goopy matter left eye for the past year.   left eye tires more especially when driving.  left eye feel heavy. 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     Elvira Tracey, COT COT 2:38 PM March 8, 2021             Assessment & Plan     Dale Durán is a 55 year old male with the following diagnoses:   1. Ptosis, left eyelid    2. Brow ptosis, bilateral         FUNCTIONAL COMPLAINTS RELATED TO DROOPY EYELIDS/BROWS:  Dale Durán describes left upper lid interfering with superior visual field and interfering with activities of daily living including reading, driving and watching television. Seen at [facility] by Dr. Syed and noted to have CURT ptosis and levator advancement was proposed. Presents for second opinion. States he is a chronic stomach sleeper and often wakes with mattering of left eyelids. No hx of DWAYNE. Has worn CL since 2010 - currently in multifocal soft CL. 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    EXAM:   Dominant eye - left eye    MRD1: Right eye 1.5  Left eye 0.5  Mild bow ptosis with brow resting below superior orbital rim left>right  Levator function 16mm OU    VISUAL FIELD:  Right eye untaped: 10 degrees Right eye taped: 50 degrees  Left eye untaped: 0 degrees Left eye taped: 50 degrees    Right eye  visual field improves by: 40 degrees  Left eye visual field improves by: 50 degrees      PLAN:  Consider left upper lid Steve's muscle conjunctival resection (9mm)    Fransisco Ferguson MD  Ophthalmology Resident, PGY-4          Attending Physician Attestation:  Complete documentation of historical and exam elements from today's encounter can be found in the full encounter summary report (not reduplicated in this progress note).  I personally obtained the chief complaint(s) and history of present illness.  I confirmed and edited as necessary the review of systems, past medical/surgical history, family history, social history, and examination findings as documented by others; and I examined the patient myself.  I personally reviewed the relevant tests, images, and reports as documented above.  I formulated and edited as necessary the assessment and plan and discussed the findings and management plan with the patient and family. I personally reviewed the ophthalmic test(s) associated with this encounter, agree with the interpretation(s) as documented by the resident/fellow, and have edited the corresponding report(s) as necessary.   -Bill Johnson MD  2:44 PM 3/8/2021    Today with Dale Durán, I reviewed the indications, risks, benefits, and alternatives of the proposed surgical procedure including, but not limited to, failure obtain the desired result  and need for additional surgery, bleeding, infection, loss of vision, loss of the eye, and the remote possibility of permanent damage to any organ system or death with the use of anesthesia.  I provided multiple opportunities for the questions, answered all questions to the best of my ability, and confirmed that my answers and my discussion were understood.     - Bill Johnson MD 3:18 PM 3/8/2021

## 2021-03-09 PROBLEM — H02.402 PTOSIS, LEFT EYELID: Status: ACTIVE | Noted: 2021-03-09

## 2021-03-09 NOTE — TELEPHONE ENCOUNTER
Spoke with patient to schedule surgery with Dr. Johnson    Surgery was scheduled on 4/14 at [facility]  Patient will have H&P at [facility]     Patient is aware a COVID-19 test is needed before their procedure. The test should be with-in 4 days of their procedure.   Test Details: Date 4/12 Location [facility]    Post-Op visit was scheduled on 4/26  Patient is aware a / is needed day of surgery.   Surgery packet was mailed 3/9, patient has my direct contact information for any further questions.

## 2021-03-23 ENCOUNTER — TELEPHONE (OUTPATIENT)
Dept: OPHTHALMOLOGY | Facility: CLINIC | Age: 56
End: 2021-03-23

## 2021-03-23 NOTE — TELEPHONE ENCOUNTER
Patient called and requested more of the sample eye drops.    Patient was advised that his eye drop prescription was called into the companies pharmacy.    Patient asked to have more samples of the eye drops to get him through his vacation.    Patient will  five sample packs of Upneeq eye drops on 3/24/21

## 2021-03-28 DIAGNOSIS — Z11.59 ENCOUNTER FOR SCREENING FOR OTHER VIRAL DISEASES: Primary | ICD-10-CM

## 2021-04-13 ENCOUNTER — ANESTHESIA EVENT (OUTPATIENT)
Dept: SURGERY | Facility: AMBULATORY SURGERY CENTER | Age: 56
End: 2021-04-13

## 2021-04-14 ENCOUNTER — NURSE TRIAGE (OUTPATIENT)
Dept: NURSING | Facility: CLINIC | Age: 56
End: 2021-04-14

## 2021-04-14 ENCOUNTER — HOSPITAL ENCOUNTER (OUTPATIENT)
Facility: AMBULATORY SURGERY CENTER | Age: 56
Discharge: HOME | End: 2021-04-14
Attending: OPHTHALMOLOGY | Admitting: OPHTHALMOLOGY
Payer: COMMERCIAL

## 2021-04-14 ENCOUNTER — ANESTHESIA (OUTPATIENT)
Dept: SURGERY | Facility: AMBULATORY SURGERY CENTER | Age: 56
End: 2021-04-14

## 2021-04-14 VITALS
BODY MASS INDEX: 22.9 KG/M2 | OXYGEN SATURATION: 99 % | WEIGHT: 160 LBS | SYSTOLIC BLOOD PRESSURE: 117 MMHG | TEMPERATURE: 96.9 F | HEIGHT: 70 IN | DIASTOLIC BLOOD PRESSURE: 74 MMHG | HEART RATE: 80 BPM | RESPIRATION RATE: 16 BRPM

## 2021-04-14 DIAGNOSIS — H02.402 PTOSIS, LEFT EYELID: ICD-10-CM

## 2021-04-14 PROCEDURE — 67908 REPAIR EYELID DEFECT: CPT | Mod: LT

## 2021-04-14 RX ORDER — NALOXONE HYDROCHLORIDE 0.4 MG/ML
0.2 INJECTION, SOLUTION INTRAMUSCULAR; INTRAVENOUS; SUBCUTANEOUS
Status: DISCONTINUED | OUTPATIENT
Start: 2021-04-14 | End: 2021-04-15 | Stop reason: HOSPADM

## 2021-04-14 RX ORDER — SODIUM CHLORIDE, SODIUM LACTATE, POTASSIUM CHLORIDE, CALCIUM CHLORIDE 600; 310; 30; 20 MG/100ML; MG/100ML; MG/100ML; MG/100ML
500 INJECTION, SOLUTION INTRAVENOUS CONTINUOUS
Status: DISCONTINUED | OUTPATIENT
Start: 2021-04-14 | End: 2021-04-14 | Stop reason: HOSPADM

## 2021-04-14 RX ORDER — LIDOCAINE HYDROCHLORIDE AND EPINEPHRINE 10; 10 MG/ML; UG/ML
INJECTION, SOLUTION INFILTRATION; PERINEURAL PRN
Status: DISCONTINUED | OUTPATIENT
Start: 2021-04-14 | End: 2021-04-14 | Stop reason: HOSPADM

## 2021-04-14 RX ORDER — SODIUM CHLORIDE, SODIUM LACTATE, POTASSIUM CHLORIDE, CALCIUM CHLORIDE 600; 310; 30; 20 MG/100ML; MG/100ML; MG/100ML; MG/100ML
INJECTION, SOLUTION INTRAVENOUS CONTINUOUS
Status: DISCONTINUED | OUTPATIENT
Start: 2021-04-14 | End: 2021-04-15 | Stop reason: HOSPADM

## 2021-04-14 RX ORDER — FENTANYL CITRATE 50 UG/ML
25-50 INJECTION, SOLUTION INTRAMUSCULAR; INTRAVENOUS
Status: DISCONTINUED | OUTPATIENT
Start: 2021-04-14 | End: 2021-04-14 | Stop reason: HOSPADM

## 2021-04-14 RX ORDER — ONDANSETRON 2 MG/ML
INJECTION INTRAMUSCULAR; INTRAVENOUS PRN
Status: DISCONTINUED | OUTPATIENT
Start: 2021-04-14 | End: 2021-04-14

## 2021-04-14 RX ORDER — ONDANSETRON 2 MG/ML
4 INJECTION INTRAMUSCULAR; INTRAVENOUS EVERY 30 MIN PRN
Status: DISCONTINUED | OUTPATIENT
Start: 2021-04-14 | End: 2021-04-15 | Stop reason: HOSPADM

## 2021-04-14 RX ORDER — OXYCODONE HYDROCHLORIDE 5 MG/1
5-10 TABLET ORAL EVERY 4 HOURS PRN
Qty: 6 TABLET | Refills: 0 | Status: SHIPPED | OUTPATIENT
Start: 2021-04-14

## 2021-04-14 RX ORDER — ONDANSETRON 4 MG/1
4 TABLET, ORALLY DISINTEGRATING ORAL EVERY 30 MIN PRN
Status: DISCONTINUED | OUTPATIENT
Start: 2021-04-14 | End: 2021-04-15 | Stop reason: HOSPADM

## 2021-04-14 RX ORDER — PROPOFOL 10 MG/ML
INJECTION, EMULSION INTRAVENOUS PRN
Status: DISCONTINUED | OUTPATIENT
Start: 2021-04-14 | End: 2021-04-14

## 2021-04-14 RX ORDER — FENTANYL CITRATE 50 UG/ML
INJECTION, SOLUTION INTRAMUSCULAR; INTRAVENOUS PRN
Status: DISCONTINUED | OUTPATIENT
Start: 2021-04-14 | End: 2021-04-14

## 2021-04-14 RX ORDER — NEOMYCIN POLYMYXIN B SULFATES AND DEXAMETHASONE 3.5; 10000; 1 MG/ML; [USP'U]/ML; MG/ML
SUSPENSION/ DROPS OPHTHALMIC
Qty: 5 ML | Refills: 0 | Status: SHIPPED | OUTPATIENT
Start: 2021-04-14

## 2021-04-14 RX ORDER — ERYTHROMYCIN 5 MG/G
OINTMENT OPHTHALMIC PRN
Status: DISCONTINUED | OUTPATIENT
Start: 2021-04-14 | End: 2021-04-14 | Stop reason: HOSPADM

## 2021-04-14 RX ORDER — ERYTHROMYCIN 5 MG/G
OINTMENT OPHTHALMIC
Qty: 3.5 G | Refills: 0 | Status: SHIPPED | OUTPATIENT
Start: 2021-04-14

## 2021-04-14 RX ORDER — OXYCODONE HYDROCHLORIDE 5 MG/1
5 TABLET ORAL EVERY 4 HOURS PRN
Status: DISCONTINUED | OUTPATIENT
Start: 2021-04-14 | End: 2021-04-15 | Stop reason: HOSPADM

## 2021-04-14 RX ORDER — LIDOCAINE HYDROCHLORIDE 20 MG/ML
INJECTION, SOLUTION INFILTRATION; PERINEURAL PRN
Status: DISCONTINUED | OUTPATIENT
Start: 2021-04-14 | End: 2021-04-14

## 2021-04-14 RX ORDER — LIDOCAINE 40 MG/G
CREAM TOPICAL
Status: DISCONTINUED | OUTPATIENT
Start: 2021-04-14 | End: 2021-04-14 | Stop reason: HOSPADM

## 2021-04-14 RX ORDER — NALOXONE HYDROCHLORIDE 0.4 MG/ML
0.4 INJECTION, SOLUTION INTRAMUSCULAR; INTRAVENOUS; SUBCUTANEOUS
Status: DISCONTINUED | OUTPATIENT
Start: 2021-04-14 | End: 2021-04-15 | Stop reason: HOSPADM

## 2021-04-14 RX ORDER — TETRACAINE HYDROCHLORIDE 5 MG/ML
SOLUTION OPHTHALMIC PRN
Status: DISCONTINUED | OUTPATIENT
Start: 2021-04-14 | End: 2021-04-14 | Stop reason: HOSPADM

## 2021-04-14 RX ORDER — MEPERIDINE HYDROCHLORIDE 25 MG/ML
12.5 INJECTION INTRAMUSCULAR; INTRAVENOUS; SUBCUTANEOUS
Status: DISCONTINUED | OUTPATIENT
Start: 2021-04-14 | End: 2021-04-15 | Stop reason: HOSPADM

## 2021-04-14 RX ADMIN — ONDANSETRON 4 MG: 2 INJECTION INTRAMUSCULAR; INTRAVENOUS at 11:32

## 2021-04-14 RX ADMIN — PROPOFOL 80 MG: 10 INJECTION, EMULSION INTRAVENOUS at 11:25

## 2021-04-14 RX ADMIN — FENTANYL CITRATE 50 MCG: 50 INJECTION, SOLUTION INTRAMUSCULAR; INTRAVENOUS at 11:38

## 2021-04-14 RX ADMIN — SODIUM CHLORIDE, SODIUM LACTATE, POTASSIUM CHLORIDE, CALCIUM CHLORIDE: 600; 310; 30; 20 INJECTION, SOLUTION INTRAVENOUS at 11:19

## 2021-04-14 RX ADMIN — LIDOCAINE HYDROCHLORIDE 60 MG: 20 INJECTION, SOLUTION INFILTRATION; PERINEURAL at 11:25

## 2021-04-14 RX ADMIN — SODIUM CHLORIDE, SODIUM LACTATE, POTASSIUM CHLORIDE, CALCIUM CHLORIDE 500 ML: 600; 310; 30; 20 INJECTION, SOLUTION INTRAVENOUS at 09:41

## 2021-04-14 RX ADMIN — FENTANYL CITRATE 50 MCG: 50 INJECTION, SOLUTION INTRAMUSCULAR; INTRAVENOUS at 11:26

## 2021-04-14 ASSESSMENT — MIFFLIN-ST. JEOR: SCORE: 1577.01

## 2021-04-14 NOTE — OP NOTE
PREOPERATIVE DIAGNOSIS: Left upper eyelid ptosis.   POSTOPERATIVE DIAGNOSIS:  Left upper eyelid ptosis.   PROCEDURE:Left upper eyelid ptosis repair by Steve's muscle conjunctival resection.   SURGEON: Maykel Johnson MD  ASSISTANT: Darius Vo MD and  Jessie Back MD  ANESTHESIA: Monitored with local infiltration1% lidocaine with epinephrine.   COMPLICATIONS: None.   ESTIMATED BLOOD LOSS: Less than 5 cc.   HISTORY: Dale Durán presented with upper lid ptosis interfering with the superior visual field and activities of daily living. After the risks, benefits and alternatives to the proposed procedure were explained, informed consent was obtained.   PROCEDURE: The patient was brought to the operating room and placed supine on the operating table. IV sedation was given. The left upper eyelid was infiltrated with local anesthetic and  was prepped and draped in the typical sterile ophthalmic fashion. Atttention was directed to the left  side.  A 4-0 silk suture was placed through the eyelid margin and the eyelid everted over a Desmarres retractor. A 6-0 silk suture was then threaded through the conjunctiva and Steve's muscle  4.5 mm from the superior tarsal border. These sutures were used to elevate the conjunctiva and Steve's muscle which was gently peeled from the underlying levator muscle. The Putterman clamp was used and clamped over the elevated tissues. A 6-0 plain gut suture was run in a horizontal mattress fashion 1 mm below the clamp from lateral to medial, then medial to lateral. The elevated tissues were excised with a 15 blade. The sutures were then externalized and tied in the lid crease. The 4-0 silk suture was removed. The lid was reverted to its normal position and ophthalmic antibiotic ointment placed in the eye.The patient tolerated the procedure well and left the operating room in stable condition.     MAYKEL JOHNSON MD

## 2021-04-14 NOTE — BRIEF OP NOTE
[facility] Brief Operative Note    Pre-operative diagnosis: Ptosis, left eyelid [H02.402]   Post-operative diagnosis Same   Procedure: Procedure(s):  Left upper lid ptosis repair   Surgeon(s): Surgeon(s) and Role:     * Bill Johnson MD - Primary     * Jessie Back MD - Resident - Assisting     * Darius Vo MD - Fellow - Assisting   Estimated blood loss: 1mL    Specimens: * No specimens in log *   Findings: As expected

## 2021-04-14 NOTE — ANESTHESIA POSTPROCEDURE EVALUATION
Patient: Dale Durán    Procedure(s):  Left upper lid ptosis repair    Diagnosis:Ptosis, left eyelid [H02.402]  Diagnosis Additional Information: No value filed.    Anesthesia Type:  MAC    Note:  Disposition: Outpatient   Postop Pain Control: Uneventful            Sign Out: Well controlled pain   PONV:    Neuro/Psych: Uneventful            Sign Out: Acceptable/Baseline neuro status   Airway/Respiratory: Uneventful            Sign Out: Acceptable/Baseline resp. status   CV/Hemodynamics: Uneventful            Sign Out: Acceptable CV status   Other NRE:    DID A NON-ROUTINE EVENT OCCUR?          Last vitals:  Vitals:    04/14/21 0918 04/14/21 1152 04/14/21 1220   BP: 115/83 114/75 117/74   Pulse: 73 80    Resp: 17 16 16   Temp: 36.6  C (97.8  F) 36.1  C (96.9  F) 36.1  C (96.9  F)   SpO2: 97% 98% 99%       Last vitals prior to Anesthesia Care Transfer:  CRNA VITALS  4/14/2021 1115 - 4/14/2021 1215      4/14/2021             Resp Rate (set):  10          Electronically Signed By: Kwesi Edouard MD  April 14, 2021  3:22 PM

## 2021-04-14 NOTE — ANESTHESIA CARE TRANSFER NOTE
Patient: Dale Durán    Procedure(s):  Left upper lid ptosis repair    Diagnosis: Ptosis, left eyelid [H02.402]  Diagnosis Additional Information: No value filed.    Anesthesia Type:   MAC     Note:    Oropharynx: oropharynx clear of all foreign objects and spontaneously breathing  Level of Consciousness: awake  Oxygen Supplementation: room air    Independent Airway: airway patency satisfactory and stable  Dentition: dentition unchanged  Vital Signs Stable: post-procedure vital signs reviewed and stable  Report to RN Given: handoff report given  Patient transferred to: Phase II    Handoff Report: Identifed the Patient, Identified the Reponsible Provider, Reviewed the pertinent medical history, Discussed the surgical course, Reviewed Intra-OP anesthesia mangement and issues during anesthesia, Set expectations for post-procedure period and Allowed opportunity for questions and acknowledgement of understanding      Vitals: (Last set prior to Anesthesia Care Transfer)  CRNA VITALS  4/14/2021 1110 - 4/14/2021 1140      4/14/2021             NIBP:  113/64    Pulse:  74    NIBP Mean:  80    Ht Rate:  69    SpO2:  95 %    Resp Rate (set):  10        Electronically Signed By: KAYKAY Lima CRNA  April 14, 2021  11:40 AM

## 2021-04-14 NOTE — ANESTHESIA PREPROCEDURE EVALUATION
Anesthesia Pre-Procedure Evaluation    Patient: Dale Durán   MRN: [id number] : 1965        Preoperative Diagnosis: Ptosis, left eyelid [H02.402]   Procedure : Procedure(s):  Left upper lid ptosis repair     No past medical history on file.   History reviewed. No pertinent surgical history.   Allergies   Allergen Reactions     Penicillins Swelling      Social History     Tobacco Use     Smoking status: Never Smoker     Smokeless tobacco: Never Used   Substance Use Topics     Alcohol use: Yes     Comment: 2-3 drinks per week      Wt Readings from Last 1 Encounters:   21 72.6 kg (160 lb)           Physical Exam    Airway        Mallampati: I   TM distance: > 3 FB   Neck ROM: full   Mouth opening: > 3 cm    Respiratory Devices and Support         Dental  no notable dental history         Cardiovascular   cardiovascular exam normal          Pulmonary   pulmonary exam normal                OUTSIDE LABS:  CBC: No results found for: WBC, HGB, HCT, PLT  BMP: No results found for: NA, POTASSIUM, CHLORIDE, CO2, BUN, CR, GLC  COAGS: No results found for: PTT, INR, FIBR  POC: No results found for: BGM, HCG, HCGS  HEPATIC: No results found for: ALBUMIN, PROTTOTAL, ALT, AST, GGT, ALKPHOS, BILITOTAL, BILIDIRECT, RITA  OTHER: No results found for: PH, LACT, A1C, DAMIR, PHOS, MAG, LIPASE, AMYLASE, TSH, T4, T3, CRP, SED    Anesthesia Plan    ASA Status:  1   NPO Status:  NPO Appropriate    Anesthesia Type: MAC.     - Reason for MAC: straight local not clinically adequate   Induction: Intravenous, Propofol.   Maintenance: TIVA.        Consents    Anesthesia Plan(s) and associated risks, benefits, and realistic alternatives discussed. Questions answered and patient/representative(s) expressed understanding.     - Discussed with:  Patient      - Extended Intubation/Ventilatory Support Discussed: No.      - Patient is DNR/DNI Status: No    Use of blood products discussed: No .     Postoperative Care    Pain management:  IV analgesics, Oral pain medications, Multi-modal analgesia.   PONV prophylaxis: Ondansetron (or other 5HT-3), Dexamethasone or Solumedrol     Comments:         H&P reviewed: Unable to attach H&P to encounter due to EHR limitations. H&P Update: appropriate H&P reviewed, patient examined. No interval changes since H&P (within 30 days).         Kwesi Edouard MD

## 2021-04-14 NOTE — DISCHARGE INSTRUCTIONS
Post-operative Instructions    Ophthalmic Plastic and Reconstructive Surgery  Bill Johnson M.D.  Darius Vo M.D., M.P.H.    All instructions apply to the operated eye(s) or eyelid(s)    What to expect after surgery:    There will be some swelling, bruising, and likely a black eye (even into the lower eyelids and cheeks). Also expect crusting and discharge from the eye and/or incisions.     A small amount of surface bleeding is normal for the first 48 hours after surgery.    You may notice some bloody tears for the first few days after surgery. This is normal.    Your eye(s) and eyelid(s) may be painful and tender. This is normal after surgery. Use the pain medication as prescribed. If your pain does not improve despite the medication, contact the office.    Wound care and personal care:    If a patch or bandage has been placed, please leave this in place until seen in clinic. Prevent the bandage from getting wet.     Apply ice compresses 15 minutes on 15 minutes off while awake for the first 2 days after surgery, then switch to warm compresses 4 times a day until seen by your physician.     For warm packs you can place a cup of dry uncooked rice in a clean cotton sock. Place sock in microwave 30 seconds to one minute. Next place the warm sock into a plastic bag and wrap the bag with clean warm wet washcloth and place over operated eye.      You may shower or wash your hair the day after surgery. Do not bathe or go swimming for 1 week to prevent contamination of your wounds.    Do not apply make-up to the eyes or eyelids for 2 weeks after surgery.    Activity restrictions and driving:    Avoid heavy lifting, bending, exercise or strenuous activity for 1 week after surgery.    You may resume other activities and return to work as tolerated.    You may not resume driving until have you stopped using narcotic pain medications(such as Norco, Percocet, Tylenol #3).    Sinus Precautions for 1 week: Sneeze  with mouth open. Cough with mouth open. No blowing nose. No straws. No bending over.    Medications:    Restart all your regular home medications and eye drops today. If you take Plavix or Aspirin on a regular basis, wait for 3 days after your surgery before restarting these in order to decrease the risk of bleeding complications.    Avoid aspirin and aspirin-like medications (Motrin, Aleve, Ibuprofen, [place] etc) for 5 days to reduce the risk of bleeding. You may take Tylenol (acetaminophen) for pain.    In addition to your home medications, take the following post-operative medications as prescribed by your physician:    Apply antibiotic ointment (erythromycin) to skin stitch three times a day, and into the operated eye(s) at night. Can use in the eye as needed for irritation, but avoid if wearing contact lens.    Instill eye drops (Maxitrol) four times a day until the bottle finished.     Take 1 to 2 pain pills (norco or oxycodone as prescribed) as needed for pain up to every 4 hours.    The pain pills may make you drowsy. You must not drive a car, operate heavy machinery or drink alcohol while taking them.    The pain pills may cause constipation and nausea. Take them with some food to prevent a stomach upset. If you continue to experience nausea, call your physician.      WARNING: All the prescription pain medications listed above contain Tylenol (acetaminophen). You must not take more than 4,000 mg of acetaminophen per 24-hour period. This is equivalent to 6 tablets of Darvocet, 8 tablets of Vicodin, or 12 tablets of Norco, Percocet or Tylenol #3. If you take other over-the-counter medications containing acetaminophen, you must take the amount of acetaminophen into account and reduce the number of prescribed pain pills accordingly.    Contact information and follow-up:    Return to the Eye Clinic for a follow-up appointment with your physician as  scheduled. If no appointment has been scheduled, call  "[phone] for an  appointment with Dr. Johnson within 1 to 2 weeks from your date of surgery.  -     Please email a few photos of your eye(s) or other operative site(s) to [email or web address] prior to your follow up visit.      For severe pain, bleeding, or loss of vision, call the Eye Clinic at [phone].    After hours or on weekends and holidays, call [phone] and ask to speak with the ophthalmologist on call.  [facility]  Home Care Following Anesthesia  For 24 hours after surgery:  1. Get plenty of rest.  A responsible adult must stay with you for at least 24 hours after you leave the surgery center.  2. Do not drive or use heavy equipment.  If you have weakness or tingling, don't drive or use heavy equipment until this feeling goes away.   3. Do not drink alcohol.   4. Avoid strenuous or risky activities.  Ask for help when climbing stairs.  5. You may feel lightheaded.  IF so, sit for a few minutes before standing.  Have someone help you get up.   6. If you have nausea (feel sick to your stomach): Drink only clear liquids such as apple juice, ginger ale, broth or 7-Up.  Rest may also help.  Be sure to drink enough fluids.  Move to a regular diet as you feel able.   7. You may have a slight fever.  Call the doctor if your fever is over 100 F (37.7 C) (taken under the tongue) or lasts longer than 24 hours.  8. You may have a dry mouth, a sore throat, muscle aches or trouble sleeping. These should go away after 24 hours.  9. Do not make important or legal decisions.   10. It is recommended to avoid smoking.        Today you received a Marcaine or bupivacaine block to numb the nerves near your surgery site.  This is a block using local anesthetic or \"numbing\" medication injected around the nerves to anesthetize or \"numb\" the area supplied by those nerves.  This block is injected into the muscle layer near your surgical site.  The medication may numb the location " where you had surgery for 6-18 hours, but may last up to 24 hours.  If your surgical site is an arm or leg you should be careful with your affected limb, since it is possible to injure your limb without being aware of it due to the numbing.  Until full feeling returns, you should guard against bumping or hitting your limb, and avoid extreme hot or cold temperatures on the skin.  As the block wears off, the feeling will return as a tingling or prickly sensation near your surgical site.  You will experience more discomfort from your incision as the feeling returns.  You may want to take a pain pill (a narcotic or Tylenol if this was prescribed by your surgeon) when you start to experience mild pain before the pain beccomes more severe.  If your pain medications do not control your pain you should notifiy your surgeon.    Tips for taking pain medications  To get the best pain relief possible, remember these points:    Take pain medications as directed, before pain becomes severe.    Pain medication can upset your stomach: taking it with food may help.    Constipation is a common side effect of pain medication. Drink plenty of  fluids.    Eat foods high in fiber. Take a stool softener if recommended by your doctor or pharmacist.    Do not drink alcohol, drive or operate machinery while taking pain medications.    Ask about other ways to control pain, such as with heat, ice or relaxation.    Tylenol/Acetaminophen Consumption  To help encourage the safe use of acetaminophen, the makers of TYLENOL  have lowered the maximum daily dose for single-ingredient Extra Strength TYLENOL  (acetaminophen) products sold in the [place] from 8 pills per day (4,000 mg) to 6 pills per day (3,000 mg). The dosing interval has also changed from 2 pills every 4-6 hours to 2 pills every 6 hours.    If you feel your pain relief is insufficient, you may take Tylenol/Acetaminophen in addition to your narcotic pain medication.     Be careful not to  exceed 3,000 mg of Tylenol/Acetaminophen in a 24 hour period from all sources.    If you are taking extra strength Tylenol/acetaminophen (500 mg), the maximum dose is 6 tablets in 24 hours.    If you are taking regular strength acetaminophen (325 mg), the maximum dose is 9 tablets in 24 hours.    Call a doctor for any of the followin. Signs of infection (fever, growing tenderness at the surgery site, a large amount of drainage or bleeding, severe pain, foul-smelling drainage, redness, swelling).  2. It has been over 8 to 10 hours since surgery and you are still not able to urinate (pass water).  3. Headache for over 24 hours.  4. Signs of Covid-19 infection (temperature over 100 degrees, shortness of breath, cough, loss of taste/smell, generalized body aches, persistent headache, chills, sore throat, nausea/vomiting/diarrhea)  Your doctor is:  Dr. Bill Johnson, Ophthalmology: [phone]  Or dial [phone] and ask for the resident on call for:  Ophthalmology  For emergency care, call the:  [place]:  [phone] (TTY for hearing impaired: [phone])

## 2021-04-15 ENCOUNTER — TELEPHONE (OUTPATIENT)
Dept: OPHTHALMOLOGY | Facility: CLINIC | Age: 56
End: 2021-04-15

## 2021-04-15 NOTE — TELEPHONE ENCOUNTER
Had eye surgery today left upper eyelid ptosis repair by Steve's muscle conjunctival resection. Having some bleeding.  Tears are red.  While talking to patient wife.  The on call ophthalmologists called the patient back.  Tanja Shultz RN MAN   Triage Nurse Advisor [facility]

## 2021-04-15 NOTE — TELEPHONE ENCOUNTER
POD1  A telephone call was made to Dale Durán to make sure the post operative course has been uneventful and that all questions were answered. There was no answer and a telephone message was left.    Darius Vo MD

## 2021-04-16 ENCOUNTER — TELEPHONE (OUTPATIENT)
Dept: OPHTHALMOLOGY | Facility: CLINIC | Age: 56
End: 2021-04-16

## 2021-04-16 NOTE — TELEPHONE ENCOUNTER
Reviewed by Dr. Vo    Ok to use contact--     Reviewed to be gentle, but ok to pull on lid to get in eye    Jethro Kruger RN 4:30 PM 04/16/21

## 2021-04-16 NOTE — TELEPHONE ENCOUNTER
Spoke to pt at 1115    Pt wears contacts 99% of time during day    Pt does not glasses, but only wears occasionally    Pt states had ok before eyelid surgery to wear contacts, but wants to verify with Dr. Vo    Note to Dr Vo for assistance    Jethro Kruger, RN 11:18 AM 04/16/21          [facility]    Phone Message    May a detailed message be left on voicemail: yes     Reason for Call: Other: Pt has a question regarding his contacts after surgery. Please call back to discuss. Thank you.     Action Taken: Message routed to:  [facility] ([facility]    Travel Screening: Not Applicable

## 2021-04-26 ENCOUNTER — OFFICE VISIT (OUTPATIENT)
Dept: OPHTHALMOLOGY | Facility: CLINIC | Age: 56
End: 2021-04-26
Payer: COMMERCIAL

## 2021-04-26 DIAGNOSIS — Z98.890 POSTOPERATIVE EYE STATE: Primary | ICD-10-CM

## 2021-04-26 PROCEDURE — 99024 POSTOP FOLLOW-UP VISIT: CPT | Performed by: OPHTHALMOLOGY

## 2021-04-26 ASSESSMENT — VISUAL ACUITY
CORRECTION_TYPE: CONTACTS
OS_PH_CC: 20/20
OD_CC+: -1
OD_CC: 20/20
OS_CC: 20/30
METHOD: SNELLEN - LINEAR
OS_PH_CC+: -2

## 2021-04-26 ASSESSMENT — TONOMETRY
IOP_METHOD: ICARE
OD_IOP_MMHG: 14
OS_IOP_MMHG: 14

## 2021-04-26 NOTE — NURSING NOTE
Chief Complaints and History of Present Illnesses   Patient presents with     Follow Up     S/P left upper lid Steve's muscle conjunctival resection 4/14/21     Chief Complaint(s) and History of Present Illness(es)     Follow Up     Laterality: left eye    Associated symptoms: Negative for eye pain, redness and tearing    Treatments tried: no treatments    Pain scale: 0/10    Comments: S/P left upper lid Steve's muscle conjunctival resection 4/14/21              Comments     Patient reports that things are going well after the procedure. Pleased with the outcome. No pain. Vision is stable with each eye.   Has soft multifocal contacts in each eye today.   EES ointment at bedtime left eye incision line.   Maxitrol left eye.QID left eye.     Elvira Tracey, COT COT 1:04 PM April 26, 2021

## 2021-04-26 NOTE — PROGRESS NOTES
Chief Complaints and History of Present Illnesses   Patient presents with     Follow Up     S/P left upper lid Steve's muscle conjunctival resection 4/14/21     Chief Complaint(s) and History of Present Illness(es)     Follow Up     In left eye.  Associated symptoms include Negative for eye pain, redness   and tearing.  Treatments tried include no treatments.  Pain was noted as   0/10. Additional comments: S/P left upper lid Steve's muscle   conjunctival resection 4/14/21              Comments     Patient reports that things are going well after the procedure. Pleased   with the outcome. No pain. Vision is stable with each eye.   Has soft multifocal contacts in each eye today.   EES ointment at bedtime left eye incision line.   Maxitrol left eye.QID left eye.     Elvira Tracey, COT COT 1:04 PM April 26, 2021          Assessment & Plan     Dale Durán is a 55 year old male with the following diagnoses:   1. Postoperative eye state       Healed  Great position  Return to clinic as needed                Attending Physician Attestation:  Complete documentation of historical and exam elements from today's encounter can be found in the full encounter summary report (not reduplicated in this progress note).  I personally obtained the chief complaint(s) and history of present illness.  I confirmed and edited as necessary the review of systems, past medical/surgical history, family history, social history, and examination findings as documented by others; and I examined the patient myself.  I personally reviewed the relevant tests, images, and reports as documented above.  I formulated and edited as necessary the assessment and plan and discussed the findings and management plan with the patient and family. .   -Bill Johnson MD  1:13 PM 4/26/2021

## 2021-05-15 ENCOUNTER — HEALTH MAINTENANCE LETTER (OUTPATIENT)
Age: 56
End: 2021-05-15

## 2021-09-04 ENCOUNTER — HEALTH MAINTENANCE LETTER (OUTPATIENT)
Age: 56
End: 2021-09-04

## 2022-06-11 ENCOUNTER — HEALTH MAINTENANCE LETTER (OUTPATIENT)
Age: 57
End: 2022-06-11

## 2022-10-16 ENCOUNTER — HEALTH MAINTENANCE LETTER (OUTPATIENT)
Age: 57
End: 2022-10-16

## 2023-02-11 NOTE — PROGRESS NOTES
Subjective:  HPI                    Objective:  System    Physical Exam    General     ROS    Assessment/Plan:    PROGRESS  REPORT    Progress reporting period is from 07/12/18.       SUBJECTIVE  Subjective changes noted by patient:  .  Subjective: Just returned from trip to [place] and neck flared up likely due to sleeping. Calming down now. R knee good while there, L knee hurt while there but improving. Descending stairs with pain R knee in morning. R low back/side is turned on. Was able to do upper body band workouts, didn't run much but walked a ton. Will be driving to [place] this weekend. Mixing up sit/stand at work. Some rib soreness that is there.    Current pain level is 2/10  .     Previous pain level was  6/10  .   Changes in function:  Yes (See Goal flowsheet attached for changes in current functional level)  Adverse reaction to treatment or activity: activity - traveling, sleeping    OBJECTIVE  Changes noted in objective findings:  Yes,   Objective: 1st rib elevation R, overactive scalenes. Hypomobile T8-12 with PA mobs. L thoracic rotation min loss with pain R T8-10, R rotation WNL. CROM R rotation and L SB min loss but WNL after 1st rib mob. Discussed to focus on quad/glute/hamstring strength as when has good workout, feels like step down in morning is better. Does not fatigue with step down much.      ASSESSMENT/PLAN  Updated problem list and treatment plan: Diagnosis 1:  Neck/upper back pain  Pain -  hot/cold therapy, manual therapy, self management, education and home program  Decreased ROM/flexibility - manual therapy and therapeutic exercise  Decreased joint mobility - manual therapy and therapeutic exercise  Decreased strength - therapeutic exercise and therapeutic activities  Decreased proprioception - neuro re-education and therapeutic activities  Inflammation - cold therapy and self management/home program  Impaired muscle performance - neuro re-education  Decreased function - therapeutic  activities  Impaired posture - neuro re-education  Diagnosis 2:  R hip/LBP   Pain -  hot/cold therapy, manual therapy, splint/taping/bracing/orthotics, self management, education and home program  Decreased ROM/flexibility - manual therapy and therapeutic exercise  Decreased joint mobility - manual therapy and therapeutic exercise  Decreased strength - therapeutic exercise and therapeutic activities  Decreased proprioception - neuro re-education and therapeutic activities  Inflammation - cold therapy and self management/home program  Impaired gait - gait training  Impaired muscle performance - neuro re-education  Decreased function - therapeutic activities  Impaired posture - neuro re-education  STG/LTGs have been met or progress has been made towards goals:  Yes (See Goal flow sheet completed today.)  Assessment of Progress: The patient's condition is improving.  Self Management Plans:  Patient has been instructed in a home treatment program.  Patient  has been instructed in self management of symptoms.    Dale continues to require the following intervention to meet STG and LTG's:  PT    Recommendations:  This patient would benefit from continued therapy.     Frequency:  1 X a month, once daily  Duration:  for 3 months        Please refer to the daily flowsheet for treatment today, total treatment time and time spent performing 1:1 timed codes.             No

## 2023-06-17 ENCOUNTER — HEALTH MAINTENANCE LETTER (OUTPATIENT)
Age: 58
End: 2023-06-17

## (undated) DEVICE — PACK MINOR EYE CUSTOM ASC

## (undated) DEVICE — ESU EYE HIGH TEMP 65410-183

## (undated) DEVICE — LINEN TOWEL PACK X5 5464

## (undated) DEVICE — SOL WATER IRRIG 500ML BOTTLE 2F7113

## (undated) DEVICE — GLOVE PROTEXIS MICRO 7.5  2D73PM75

## (undated) DEVICE — EYE PREP BETADINE 5% SOLUTION 30ML 0065-0411-30

## (undated) RX ORDER — FENTANYL CITRATE 50 UG/ML
INJECTION, SOLUTION INTRAMUSCULAR; INTRAVENOUS
Status: DISPENSED
Start: 2021-04-14

## (undated) RX ORDER — PROPOFOL 10 MG/ML
INJECTION, EMULSION INTRAVENOUS
Status: DISPENSED
Start: 2021-04-14

## (undated) RX ORDER — LIDOCAINE HYDROCHLORIDE 20 MG/ML
INJECTION, SOLUTION EPIDURAL; INFILTRATION; INTRACAUDAL; PERINEURAL
Status: DISPENSED
Start: 2021-04-14

## (undated) RX ORDER — ONDANSETRON 2 MG/ML
INJECTION INTRAMUSCULAR; INTRAVENOUS
Status: DISPENSED
Start: 2021-04-14